# Patient Record
Sex: FEMALE | Race: BLACK OR AFRICAN AMERICAN | Employment: FULL TIME | URBAN - METROPOLITAN AREA
[De-identification: names, ages, dates, MRNs, and addresses within clinical notes are randomized per-mention and may not be internally consistent; named-entity substitution may affect disease eponyms.]

---

## 2017-10-23 ENCOUNTER — OP HISTORICAL/CONVERTED ENCOUNTER (OUTPATIENT)
Dept: OTHER | Age: 57
End: 2017-10-23

## 2018-02-11 ENCOUNTER — ED HISTORICAL/CONVERTED ENCOUNTER (OUTPATIENT)
Dept: OTHER | Age: 58
End: 2018-02-11

## 2018-04-23 ENCOUNTER — OP HISTORICAL/CONVERTED ENCOUNTER (OUTPATIENT)
Dept: OTHER | Age: 58
End: 2018-04-23

## 2019-02-09 ENCOUNTER — ED HISTORICAL/CONVERTED ENCOUNTER (OUTPATIENT)
Dept: OTHER | Age: 59
End: 2019-02-09

## 2019-04-07 ENCOUNTER — ED HISTORICAL/CONVERTED ENCOUNTER (OUTPATIENT)
Dept: OTHER | Age: 59
End: 2019-04-07

## 2019-10-15 ENCOUNTER — OP HISTORICAL/CONVERTED ENCOUNTER (OUTPATIENT)
Dept: OTHER | Age: 59
End: 2019-10-15

## 2020-01-01 ENCOUNTER — ED HISTORICAL/CONVERTED ENCOUNTER (OUTPATIENT)
Dept: OTHER | Age: 60
End: 2020-01-01

## 2020-05-28 LAB
CREATININE, EXTERNAL: 0.79
LDL-C, EXTERNAL: 129

## 2020-08-27 VITALS
OXYGEN SATURATION: 95 % | RESPIRATION RATE: 24 BRPM | HEIGHT: 67 IN | HEART RATE: 91 BPM | SYSTOLIC BLOOD PRESSURE: 128 MMHG | DIASTOLIC BLOOD PRESSURE: 76 MMHG | BODY MASS INDEX: 40.34 KG/M2 | TEMPERATURE: 98.9 F | WEIGHT: 257 LBS

## 2020-08-27 PROBLEM — F41.9 ANXIETY: Status: ACTIVE | Noted: 2020-08-27

## 2020-08-27 PROBLEM — Z95.828 PRESENCE OF STENT IN ARTERY: Status: ACTIVE | Noted: 2020-08-27

## 2020-08-27 PROBLEM — E78.00 PURE HYPERCHOLESTEROLEMIA: Status: ACTIVE | Noted: 2020-08-27

## 2020-08-27 PROBLEM — R53.83 FATIGUE: Status: ACTIVE | Noted: 2020-08-27

## 2020-08-27 PROBLEM — J30.9 ALLERGIC RHINITIS: Status: ACTIVE | Noted: 2020-08-27

## 2020-08-27 PROBLEM — E55.9 VITAMIN D DEFICIENCY: Status: ACTIVE | Noted: 2020-08-27

## 2020-08-27 PROBLEM — R05.9 COUGH: Status: ACTIVE | Noted: 2020-08-27

## 2020-08-27 PROBLEM — I25.10 CORONARY ATHEROSCLEROSIS OF NATIVE CORONARY ARTERY: Status: ACTIVE | Noted: 2020-08-27

## 2020-08-27 PROBLEM — M54.9 BACKACHE: Status: ACTIVE | Noted: 2020-08-27

## 2020-08-27 PROBLEM — K21.9 GERD (GASTROESOPHAGEAL REFLUX DISEASE): Status: ACTIVE | Noted: 2020-08-27

## 2020-08-27 PROBLEM — E66.9 OBESITY: Status: ACTIVE | Noted: 2020-08-27

## 2020-08-27 PROBLEM — I10 ESSENTIAL HYPERTENSION: Status: ACTIVE | Noted: 2020-08-27

## 2020-08-28 ENCOUNTER — OFFICE VISIT (OUTPATIENT)
Dept: INTERNAL MEDICINE CLINIC | Age: 60
End: 2020-08-28
Payer: COMMERCIAL

## 2020-08-28 VITALS
RESPIRATION RATE: 18 BRPM | BODY MASS INDEX: 36.85 KG/M2 | DIASTOLIC BLOOD PRESSURE: 80 MMHG | WEIGHT: 234.8 LBS | HEIGHT: 67 IN | SYSTOLIC BLOOD PRESSURE: 130 MMHG | TEMPERATURE: 98.6 F | HEART RATE: 72 BPM | OXYGEN SATURATION: 98 %

## 2020-08-28 DIAGNOSIS — I10 ESSENTIAL HYPERTENSION: Primary | ICD-10-CM

## 2020-08-28 DIAGNOSIS — E78.00 PURE HYPERCHOLESTEROLEMIA: ICD-10-CM

## 2020-08-28 DIAGNOSIS — Z95.828 PRESENCE OF STENT IN ARTERY: ICD-10-CM

## 2020-08-28 DIAGNOSIS — I25.10 ATHEROSCLEROSIS OF NATIVE CORONARY ARTERY OF NATIVE HEART WITHOUT ANGINA PECTORIS: ICD-10-CM

## 2020-08-28 DIAGNOSIS — M25.511 ACUTE PAIN OF RIGHT SHOULDER: ICD-10-CM

## 2020-08-28 DIAGNOSIS — E55.9 VITAMIN D DEFICIENCY: ICD-10-CM

## 2020-08-28 DIAGNOSIS — K21.9 GASTROESOPHAGEAL REFLUX DISEASE WITHOUT ESOPHAGITIS: ICD-10-CM

## 2020-08-28 PROCEDURE — 99214 OFFICE O/P EST MOD 30 MIN: CPT | Performed by: INTERNAL MEDICINE

## 2020-08-28 RX ORDER — OMEPRAZOLE 40 MG/1
40 CAPSULE, DELAYED RELEASE ORAL DAILY
Qty: 30 CAP | Refills: 2 | Status: SHIPPED | OUTPATIENT
Start: 2020-08-28 | End: 2021-02-09 | Stop reason: ALTCHOICE

## 2020-08-28 RX ORDER — TRAMADOL HYDROCHLORIDE 50 MG/1
50 TABLET ORAL
Qty: 30 TAB | Refills: 0 | Status: SHIPPED | OUTPATIENT
Start: 2020-08-28 | End: 2020-08-31

## 2020-08-28 RX ORDER — AMLODIPINE BESYLATE 5 MG/1
1 TABLET ORAL DAILY
COMMUNITY
Start: 2020-07-03 | End: 2021-02-09 | Stop reason: ALTCHOICE

## 2020-08-28 RX ORDER — ERGOCALCIFEROL 1.25 MG/1
50000 CAPSULE ORAL
Qty: 12 CAP | Refills: 0 | Status: SHIPPED | OUTPATIENT
Start: 2020-08-28 | End: 2021-02-09 | Stop reason: ALTCHOICE

## 2020-08-28 RX ORDER — ATORVASTATIN CALCIUM 40 MG/1
1 TABLET, FILM COATED ORAL DAILY
COMMUNITY
Start: 2020-05-26 | End: 2021-12-30 | Stop reason: SDUPTHER

## 2020-08-28 RX ORDER — METHYLPREDNISOLONE 4 MG/1
TABLET ORAL
Qty: 1 DOSE PACK | Refills: 0 | Status: SHIPPED | OUTPATIENT
Start: 2020-08-28 | End: 2020-10-28 | Stop reason: ALTCHOICE

## 2020-08-28 NOTE — PROGRESS NOTES
Clement Jessica is a 64 y.o. female and presents with Hypertension; Cholesterol Problem; and Follow Up Chronic Condition    She has consulted me last time in October 2019 and at that time she had her labs done at that time her lipid profile was uncontrolled with total cholesterol 232 and  with vitamin D was low in the past with 15 and hemoglobin A1c was 5.7 and currently she is taking atorvastatin 40 mg at bedtime. She is a known case of CAD status post stent she cannot take NSAIDs for long-term use. She has started acute right shoulder pain for last 2 weeks. According to her at her job she has to lift a lot of heavy boxes and she has not been able to elevate her right shoulder about the joint and also not able to have range of movement. She does not have history of fall or trauma. She told me that now she is started wearing the wig because she cannot reach to her hair. She is taking amlodipine 5 mg/day and she follows cardiologist Dr. Elvin Bocanegra, who has told her not to take telmisartan and just to continue amlodipine 5 mg/day. She has heartburn but currently not taking any medicines. She agreed for x-ray and the Medrol Dosepak and physical therapy once her pain is better. Agreed for doing blood work. She has no depression. She has quit smoking cigarettes after having coronary artery disease. .  Nothing helps in relief of her right shoulder pain and she is not taking anything. Not taking even Tylenol. Review of Systems    Review of Systems   Constitutional: Negative. HENT: Negative for sinus pain. Respiratory: Negative for cough, shortness of breath and wheezing. Cardiovascular: Negative. Gastrointestinal: Negative. Genitourinary: Negative. Musculoskeletal: Positive for joint pain. RT SHOULDER PAIN   Neurological: Negative for dizziness, tremors, speech change and headaches. Psychiatric/Behavioral: Negative for depression. The patient is not nervous/anxious. Nothing helps in relief of her right shoulder pain    Past Medical History:   Diagnosis Date    CAD (coronary artery disease)     Hypercholesterolemia     Hypertension      Past Surgical History:   Procedure Laterality Date    HX COLONOSCOPY  08/28/2017    HX CORONARY STENT PLACEMENT      HX PARTIAL HYSTERECTOMY       Social History     Socioeconomic History    Marital status:      Spouse name: Not on file    Number of children: Not on file    Years of education: Not on file    Highest education level: Not on file   Tobacco Use    Smoking status: Former Smoker    Smokeless tobacco: Never Used   Substance and Sexual Activity    Alcohol use: Yes     Comment: occ     Family History   Problem Relation Age of Onset    Arthritis-osteo Mother     Diabetes Father      Current Outpatient Medications   Medication Sig Dispense Refill    folic acid/multivit-min/lutein (CENTRUM SILVER PO) Take 1 Tab by mouth daily.  traMADoL (ULTRAM) 50 mg tablet Take 1 Tab by mouth nightly as needed for Pain for up to 3 days. Max Daily Amount: 50 mg. 30 Tab 0    omeprazole (PRILOSEC) 40 mg capsule Take 1 Cap by mouth daily. Take 30 minutes before eating 30 Cap 2    methylPREDNISolone (MEDROL DOSEPACK) 4 mg tablet Use as directed with food. Indications: inflammation of the sac surrounding the joint - bursitis, rt shoulder pain .h/o stent . can not take NSAIDS. 1 Dose Pack 0    ergocalciferol (ERGOCALCIFEROL) 1,250 mcg (50,000 unit) capsule Take 1 Cap by mouth every seven (7) days. 12 Cap 0    ADULT LOW DOSE ASPIRIN PO Take 1 Tab by mouth daily.  amLODIPine (NORVASC) 5 mg tablet Take 1 Tab by mouth daily.  atorvastatin (LIPITOR) 40 mg tablet Take 1 Tab by mouth daily.        No Known Allergies    Objective:  Visit Vitals  /80 (BP 1 Location: Left arm, BP Patient Position: Sitting)   Pulse 72   Temp 98.6 °F (37 °C) (Oral)   Resp 18   Ht 5' 7\" (1.702 m)   Wt 234 lb 12.8 oz (106.5 kg)   SpO2 98% BMI 36.77 kg/m²       Physical Exam:   Constitutional: General Appearance: healthy-appearing / obese. Level of Distress: NAD. Ambulation: ambulating   Psychiatric: Mental Status: normal mood and affect and active and alert. Orientation: to time, place, and person. no agitation. ,normal eye contact. normal insight  Head: Head: normocephalic and atraumatic. Eyes: Pupils: PERRLA. Sclerae: non-icteric. Neck: Neck: supple, trachea midline, and no masses. Lymph Nodes: no cervical LAD. Thyroid: no enlargement or nodules and non-tender. Lungs: Respiratory effort: no dyspnea. Auscultation: no wheezing, rales/crackles, or rhonchi and breath sounds normal and good air movement. Cardiovascular: Apical Impulse: not displaced. Heart Auscultation: normal S1 and S2; no murmurs, rubs, or gallops; and RRR. Neck vessels: no carotid bruits. Pulses including femoral / pedal: normal throughout. Abdomen: Bowel Sounds: normal. Inspection and Palpation: no tenderness, guarding, or masses and soft and non-distended. Liver: non-tender and no hepatomegaly. Spleen: non-tender and no splenomegaly. Musculoskeletal[de-identified] Extremities: no edema,no varicosities. No Calf tenderness. Neurologic: Gait and Station: normal gait and station. Motor Strength normal right and left. Skin: Inspection and palpation: no rash, lesions, or ulcer. Results for orders placed or performed in visit on 08/27/20   AMB EXT LDL-C   Result Value Ref Range    LDL-C, External 129    AMB EXT CREATININE   Result Value Ref Range    Creatinine, External 0.79        Assessment/Plan:    ICD-10-CM ICD-9-CM    1. Essential hypertension  I10 401.9 CBC WITH AUTOMATED DIFF      METABOLIC PANEL, COMPREHENSIVE   2. Pure hypercholesterolemia  E78.00 272.0 LIPID PANEL   3.  Acute pain of right shoulder  M25.511 719.41 XR SHOULDER RT AP/LAT MIN 2 V      traMADoL (ULTRAM) 50 mg tablet      REFERRAL TO PHYSICAL THERAPY   4. Vitamin D deficiency  E55.9 268.9 VITAMIN D, 25 HYDROXY   5. Gastroesophageal reflux disease without esophagitis  K21.9 530.81    6. Atherosclerosis of native coronary artery of native heart without angina pectoris  I25.10 414.01    7. Presence of stent in artery  Z95.828 V49.89      Orders Placed This Encounter    XR SHOULDER RT AP/LAT MIN 2 V     Standing Status:   Future     Standing Expiration Date:   9/28/2021     Order Specific Question:   Reason for Exam     Answer:   pain with restriceted rom with extension and elevation ,doing lots of lifting boxes    CBC WITH AUTOMATED DIFF    METABOLIC PANEL, COMPREHENSIVE    LIPID PANEL    VITAMIN D, 25 HYDROXY    [facility]     Referral Priority:   Routine     Referral Type:   PT/OT/ST     Referral Reason:   Specialty Services Required     Referral Location:   [facility] [place] [address]     Requested Specialty:   Physical Therapy     Number of Visits Requested:   12    ADULT LOW DOSE ASPIRIN PO     Sig: Take 1 Tab by mouth daily.  amLODIPine (NORVASC) 5 mg tablet     Sig: Take 1 Tab by mouth daily.  atorvastatin (LIPITOR) 40 mg tablet     Sig: Take 1 Tab by mouth daily.  folic acid/multivit-min/lutein (CENTRUM SILVER PO)     Sig: Take 1 Tab by mouth daily.  traMADoL (ULTRAM) 50 mg tablet     Sig: Take 1 Tab by mouth nightly as needed for Pain for up to 3 days. Max Daily Amount: 50 mg. Dispense:  30 Tab     Refill:  0    omeprazole (PRILOSEC) 40 mg capsule     Sig: Take 1 Cap by mouth daily. Take 30 minutes before eating     Dispense:  30 Cap     Refill:  2    methylPREDNISolone (MEDROL DOSEPACK) 4 mg tablet     Sig: Use as directed with food. Indications: inflammation of the sac surrounding the joint - bursitis, rt shoulder pain .h/o stent . can not take NSAIDS. Dispense:  1 Dose Pack     Refill:  0    ergocalciferol (ERGOCALCIFEROL) 1,250 mcg (50,000 unit) capsule     Sig: Take 1 Cap by mouth every seven (7) days.      Dispense:  12 Cap Refill:  0      hypertension controlled having CAD status post stent she is  taking only amlodipine 5 mg/day and not taking telmisartan that surprised me having a better cardiac protection she will discuss with her cardiologist when she has an  upcoming appointment for follow-up in September decision will be, up to her cardiologist and I discussed with patient. She has quit smoking cigarettes since she has stent placement. Diet low in sodium and heart healthy diet. Right shoulder pain various  differential discussed including bursitis or tendinitis and severe arthritis or partial rotator cuff tear she has started for last 2 weeks with unable to do range of movement, to the extent that she is not able comb her hair and  wearing the wig, she cannot take NSAID I started her on tramadol 50 mg at bedtime only and given her Medrol Dosepak and then once she is pain-free or her pain is better she can start physical therapy that I have referred her. Heating pad application and x-ray ordered before ordering any further imagings like MRI. Follow-up in 2 months. Vitamin D deficiency taking vitamin D once a week repeat labs ordered. Hypercholesterolemia repeat labs ordered continue atorvastatin 40 mg at bedtime diet low in fat and educational handouts provided. GERD refills given for omeprazole,. Allergic rhinitis continue symptomatic treatment but currently she does not have any abnormal symptoms otherwise she used to take montelukast and fluticasone nasal spray in the past.    Morbid obesity with BMI 36.7 heart healthy diet with diet less than 1500-calorie diet plan and walking minimum 2 miles 5 days a week to keep her heart healthy and to prevent stroke and cardiovascular and cerebrovascular morbidities mortalities and musculoskeletal pain. I reviewed the labs and logging in [place] discussed with her fasting labs ordered. Refills given. Follow-up in 2 months. Answered all her questions.   Patient appreciated. lose weight, increase physical activity, follow low fat diet, follow low salt diet, routine labs ordered, call if any problems    There are no Patient Instructions on file for this visit. Follow-up and Dispositions    · Return in about 2 months (around 10/28/2020) for rt shoulder pain ,fasting lab now ,ref to physical therapy,MULTIPLE MEDICAL COMORBIDITIES.

## 2020-10-28 ENCOUNTER — HOSPITAL ENCOUNTER (EMERGENCY)
Age: 60
Discharge: HOME | End: 2020-10-29
Payer: COMMERCIAL

## 2020-10-28 ENCOUNTER — APPOINTMENT (OUTPATIENT)
Dept: NON INVASIVE DIAGNOSTICS | Age: 60
End: 2020-10-28
Attending: EMERGENCY MEDICINE
Payer: COMMERCIAL

## 2020-10-28 ENCOUNTER — OFFICE VISIT (OUTPATIENT)
Dept: INTERNAL MEDICINE CLINIC | Age: 60
End: 2020-10-28
Payer: COMMERCIAL

## 2020-10-28 VITALS
BODY MASS INDEX: 36.51 KG/M2 | DIASTOLIC BLOOD PRESSURE: 82 MMHG | RESPIRATION RATE: 18 BRPM | HEIGHT: 67 IN | TEMPERATURE: 98 F | WEIGHT: 232.6 LBS | OXYGEN SATURATION: 98 % | HEART RATE: 72 BPM | SYSTOLIC BLOOD PRESSURE: 150 MMHG

## 2020-10-28 VITALS
BODY MASS INDEX: 36.41 KG/M2 | HEIGHT: 67 IN | HEART RATE: 68 BPM | SYSTOLIC BLOOD PRESSURE: 159 MMHG | RESPIRATION RATE: 17 BRPM | DIASTOLIC BLOOD PRESSURE: 96 MMHG | OXYGEN SATURATION: 99 % | TEMPERATURE: 98.1 F | WEIGHT: 232 LBS

## 2020-10-28 DIAGNOSIS — M79.605 PAIN IN BOTH LOWER EXTREMITIES: Primary | ICD-10-CM

## 2020-10-28 DIAGNOSIS — M79.89 PAIN AND SWELLING OF LOWER LEG, LEFT: ICD-10-CM

## 2020-10-28 DIAGNOSIS — M79.604 PAIN IN BOTH LOWER EXTREMITIES: Primary | ICD-10-CM

## 2020-10-28 DIAGNOSIS — Z95.828 PRESENCE OF STENT IN ARTERY: ICD-10-CM

## 2020-10-28 DIAGNOSIS — I10 ESSENTIAL HYPERTENSION: ICD-10-CM

## 2020-10-28 DIAGNOSIS — I25.10 ATHEROSCLEROSIS OF NATIVE CORONARY ARTERY OF NATIVE HEART WITHOUT ANGINA PECTORIS: ICD-10-CM

## 2020-10-28 DIAGNOSIS — R60.9 DEPENDENT EDEMA: Primary | ICD-10-CM

## 2020-10-28 DIAGNOSIS — M79.662 PAIN AND SWELLING OF LOWER LEG, LEFT: ICD-10-CM

## 2020-10-28 DIAGNOSIS — E66.01 SEVERE OBESITY (HCC): ICD-10-CM

## 2020-10-28 DIAGNOSIS — M79.89 LEG SWELLING: ICD-10-CM

## 2020-10-28 LAB
ALBUMIN SERPL-MCNC: 4 G/DL (ref 3.5–5)
ALBUMIN/GLOB SERPL: 1 {RATIO} (ref 1.1–2.2)
ALP SERPL-CCNC: 92 U/L (ref 45–117)
ALT SERPL-CCNC: 20 U/L (ref 12–78)
ANION GAP SERPL CALC-SCNC: 7 MMOL/L (ref 5–15)
AST SERPL W P-5'-P-CCNC: 16 U/L (ref 15–37)
BASOPHILS # BLD: 0 K/UL (ref 0–0.1)
BASOPHILS NFR BLD: 0 % (ref 0–1)
BILIRUB SERPL-MCNC: 0.4 MG/DL (ref 0.2–1)
BNP SERPL-MCNC: 31 PG/ML
BUN SERPL-MCNC: 12 MG/DL (ref 6–20)
BUN/CREAT SERPL: 13 (ref 12–20)
CA-I BLD-MCNC: 9.6 MG/DL (ref 8.5–10.1)
CHLORIDE SERPL-SCNC: 108 MMOL/L (ref 97–108)
CO2 SERPL-SCNC: 27 MMOL/L (ref 21–32)
CREAT SERPL-MCNC: 0.94 MG/DL (ref 0.55–1.02)
DIFFERENTIAL METHOD BLD: ABNORMAL
EOSINOPHIL # BLD: 0.2 K/UL (ref 0–0.4)
EOSINOPHIL NFR BLD: 3 % (ref 0–7)
ERYTHROCYTE [DISTWIDTH] IN BLOOD BY AUTOMATED COUNT: 13.7 % (ref 11.5–14.5)
GLOBULIN SER CALC-MCNC: 4 G/DL (ref 2–4)
GLUCOSE SERPL-MCNC: 82 MG/DL (ref 65–100)
HCT VFR BLD AUTO: 44.4 % (ref 35–47)
HGB BLD-MCNC: 13.9 G/DL (ref 11.5–16)
IMM GRANULOCYTES # BLD AUTO: 0 K/UL (ref 0–0.04)
IMM GRANULOCYTES NFR BLD AUTO: 0 % (ref 0–0.5)
LYMPHOCYTES # BLD: 3.4 K/UL (ref 0.8–3.5)
LYMPHOCYTES NFR BLD: 49 % (ref 12–49)
MCH RBC QN AUTO: 27.1 PG (ref 26–34)
MCHC RBC AUTO-ENTMCNC: 31.3 G/DL (ref 30–36.5)
MCV RBC AUTO: 86.5 FL (ref 80–99)
MONOCYTES # BLD: 0.4 K/UL (ref 0–1)
MONOCYTES NFR BLD: 7 % (ref 5–13)
NEUTS SEG # BLD: 2.8 K/UL (ref 1.8–8)
NEUTS SEG NFR BLD: 41 % (ref 32–75)
NRBC # BLD: 0.05 K/UL (ref 0–0.01)
NRBC BLD-RTO: 0.7 PER 100 WBC
PLATELET # BLD AUTO: 186 K/UL (ref 150–400)
PMV BLD AUTO: 11.9 FL (ref 8.9–12.9)
POTASSIUM SERPL-SCNC: 4.5 MMOL/L (ref 3.5–5.1)
PROT SERPL-MCNC: 8 G/DL (ref 6.4–8.2)
RBC # BLD AUTO: 5.13 M/UL (ref 3.8–5.2)
SODIUM SERPL-SCNC: 142 MMOL/L (ref 136–145)
TROPONIN I SERPL-MCNC: <0.05 NG/ML
WBC # BLD AUTO: 6.7 K/UL (ref 3.6–11)

## 2020-10-28 PROCEDURE — 36415 COLL VENOUS BLD VENIPUNCTURE: CPT

## 2020-10-28 PROCEDURE — 99213 OFFICE O/P EST LOW 20 MIN: CPT | Performed by: INTERNAL MEDICINE

## 2020-10-28 PROCEDURE — 80053 COMPREHEN METABOLIC PANEL: CPT

## 2020-10-28 PROCEDURE — 84484 ASSAY OF TROPONIN QUANT: CPT

## 2020-10-28 PROCEDURE — 99283 EMERGENCY DEPT VISIT LOW MDM: CPT

## 2020-10-28 PROCEDURE — 85025 COMPLETE CBC W/AUTO DIFF WBC: CPT

## 2020-10-28 PROCEDURE — 83880 ASSAY OF NATRIURETIC PEPTIDE: CPT

## 2020-10-28 PROCEDURE — 93971 EXTREMITY STUDY: CPT

## 2020-10-28 RX ORDER — LOSARTAN POTASSIUM AND HYDROCHLOROTHIAZIDE 12.5; 5 MG/1; MG/1
1 TABLET ORAL DAILY
COMMUNITY
End: 2021-12-30 | Stop reason: SDUPTHER

## 2020-10-28 NOTE — ED TRIAGE NOTES
Sent by Dr. Nedra Greene for possible blood clot in Left leg. Pt states she works on her feet and has complaints on bilateral foot and ankle pain.

## 2020-10-28 NOTE — PROGRESS NOTES
Fernando Walton is a 64 y.o. female and presents with Hypertension (C/O BILATERAL FOOT AND LEGS X 1 WEEK, denies trauma or injury but does a lot of walking at work     9/10); Cholesterol Problem; Follow Up Chronic Condition; and Foot Pain    She came having severe pain in both lower legs left more than right and swelling in both lower legs with swelling of food for last 1 week without having history of fall or trauma , she has difficulty in walking due to pain she has CAD s/p stent with history of hypertension and hypercholesterolemia,. Her blood pressure is elevated today in the office. Review of Systems    Review of Systems   Constitutional: Negative. Eyes: Negative for blurred vision. Respiratory: Negative. Cardiovascular: Positive for leg swelling. Negative for chest pain, palpitations, orthopnea and claudication. Bilateral leg swelling for last 1 week   Gastrointestinal: Negative. Genitourinary: Negative. Musculoskeletal:        Bilateral leg pain and lt foot more than rt foot. for 1 week   Skin: Negative. Neurological: Negative for dizziness, tingling, speech change and headaches. Psychiatric/Behavioral: Negative for depression. The patient does not have insomnia.          Past Medical History:   Diagnosis Date    CAD (coronary artery disease)     Hypercholesterolemia     Hypertension      Past Surgical History:   Procedure Laterality Date    HX COLONOSCOPY  08/28/2017    HX CORONARY STENT PLACEMENT      HX PARTIAL HYSTERECTOMY       Social History     Socioeconomic History    Marital status:      Spouse name: Not on file    Number of children: Not on file    Years of education: Not on file    Highest education level: Not on file   Tobacco Use    Smoking status: Former Smoker    Smokeless tobacco: Never Used   Substance and Sexual Activity    Alcohol use: Yes     Comment: occ     Family History   Problem Relation Age of Onset    Arthritis-osteo Mother    [address] [facility] Diabetes Father      Current Outpatient Medications   Medication Sig Dispense Refill    losartan-hydroCHLOROthiazide (HYZAAR) 50-12.5 mg per tablet Take 1 Tab by mouth daily.  ADULT LOW DOSE ASPIRIN PO Take 1 Tab by mouth daily.  amLODIPine (NORVASC) 5 mg tablet Take 1 Tab by mouth daily.  atorvastatin (LIPITOR) 40 mg tablet Take 1 Tab by mouth daily.  folic acid/multivit-min/lutein (CENTRUM SILVER PO) Take 1 Tab by mouth daily.  omeprazole (PRILOSEC) 40 mg capsule Take 1 Cap by mouth daily. Take 30 minutes before eating 30 Cap 2    ergocalciferol (ERGOCALCIFEROL) 1,250 mcg (50,000 unit) capsule Take 1 Cap by mouth every seven (7) days. 12 Cap 0     No Known Allergies    Objective:  Visit Vitals  BP (!) 150/82 (BP 1 Location: Right arm, BP Patient Position: Sitting)   Pulse 72   Temp 98 °F (36.7 °C) (Temporal)   Resp 18   Ht 5' 7\" (1.702 m)   Wt 232 lb 9.6 oz (105.5 kg)   SpO2 98%   BMI 36.43 kg/m²       Physical Exam:   Constitutional: General Appearance: Morbid obese with pleasant personality in mild distress due to pain. Level of Distress: Mild. Psychiatric: Mental Status: normal mood and affect Orientation: to time, place, and person. ,normal eye contact. Head: Head: normocephalic and atraumatic. Eyes: Pupils: PERRLA. Sclerae: non-icteric. Neck: Neck: supple, trachea midline, and no masses. Lymph Nodes: no cervical LAD. Thyroid: no enlargement or nodules and non-tender. Lungs: Respiratory effort: no dyspnea. Auscultation: no wheezing, rales/crackles, or rhonchi and breath sounds normal and good air movement. Cardiovascular: Apical Impulse: not displaced. Heart Auscultation: normal S1 and S2; no murmurs, rubs, or gallops; and RRR. Neck vessels: no carotid bruits. Pulses including femoral / pedal: normal throughout. Abdomen: Bowel Sounds: normal. Inspection and Palpation: no tenderness, guarding, or masses and soft and non-distended. Liver: non-tender and no hepatomegaly. Spleen: non-tender and no splenomegaly. Musculoskeletal[de-identified] Extremities: Mild edema in both lower legs left more than right also swelling of left foot,no varicosities. Calf tenderness. With superficial palpation could not do deep palpation. Restricted range of movement at the left foot. Neurologic: Gait and Station:  limping gait. Skin: Inspection and palpation: Peripheral edema in both lower legs could not allow me to do deep palpation of both lower legs especially left lower leg having pain,. Results for orders placed or performed in visit on 08/27/20   AMB EXT LDL-C   Result Value Ref Range    LDL-C, External 129    AMB EXT CREATININE   Result Value Ref Range    Creatinine, External 0.79        Assessment/Plan:      ICD-10-CM ICD-9-CM    1. Pain in both lower extremities  M79.604 729.5     M79.605     2. Severe obesity ([address]  E66.01 278.01    3. Pain and swelling of lower leg, left  M79.662 729.5     M79.89 729.81    4. Essential hypertension  I10 401.9    5. Atherosclerosis of native coronary artery of native heart without angina pectoris  I25.10 414.01    6. Presence of stent in artery  Z95.828 V49.89      Orders Placed This Encounter    losartan-hydroCHLOROthiazide (HYZAAR) 50-12.5 mg per tablet     Sig: Take 1 Tab by mouth daily. Bilateral leg pain and swelling with limping gait for last 1 week, her blood pressure is elevated according to her it is most likely due to pain, she was brought by her son, I will send her to ER to rule out, blood clot, she agreed she already takes losartan hydrochlorothiazide 50/12.5 mg/day and amlodipine 5 mg/day. I will evaluate her after discharge from the ER when she is pain-free, with her reevaluation of blood pressure and if needed I will make changes in her antihypertensive she has CAD s/p stent continued on atorvastatin 40 mg/day.   I asked my nurse to call emergency room, and notified that she is coming in she told me her son will come and will take her to ER.  Education given. No history of fall or trauma she has restricted range of movement at left foot  continue present plan, she did not allow me to do palpation of the calf muscle having pain especially left leg,    There are no Patient Instructions on file for this visit. Follow-up and Dispositions    · Return in about 6 weeks (around 12/9/2020) for ptn is sent to ER to r/o dvt, follow-up After discharge from ER.

## 2020-10-29 NOTE — ED NOTES
Spoke with patient in reference to her negative DVT, increased flow  preliminary report. Pt states she has an appointment with her cardiologist next week.

## 2020-10-29 NOTE — ED PROVIDER NOTES
EMERGENCY DEPARTMENT HISTORY AND PHYSICAL EXAM      Date: 10/28/2020  Patient Name: Rufino Rice    History of Presenting Illness     Chief Complaint   Patient presents with    Leg Pain    Foot Pain       History Provided By: Patient    HPI: Rufino Rice, 64 y.o. female with a past medical history significant diabetes, hypertension, hyperlipidemia and CAD presents to the ED with cc of bilateral foot and leg pain onset 1 week ago and intermittent in nature. She describes the pain as severe however the pain has resolved since being in the emergency department. Associated symptoms include swelling, left greater than right. Patient was seen by her PCP today who was concerned about the swelling and sent her to the ED to be evaluated for a DVT. Patient denies any history of blood clots. She does have a history of coronary artery disease however she no longer takes Brilinta. She quit smoking recently. She is on her feet at work for multiple hours per day. She denies fever, chills, chest pain, shortness of breath, recent travel, history of cancer, recent illness, recent injury, recent surgery, any known trauma, numbness, tingling      There are no other complaints, changes, or physical findings at this time. PCP: Ami Ku MD    No current facility-administered medications on file prior to encounter. Current Outpatient Medications on File Prior to Encounter   Medication Sig Dispense Refill    losartan-hydroCHLOROthiazide (HYZAAR) 50-12.5 mg per tablet Take 1 Tab by mouth daily.  ADULT LOW DOSE ASPIRIN PO Take 1 Tab by mouth daily.  amLODIPine (NORVASC) 5 mg tablet Take 1 Tab by mouth daily.  atorvastatin (LIPITOR) 40 mg tablet Take 1 Tab by mouth daily.  folic acid/multivit-min/lutein (CENTRUM SILVER PO) Take 1 Tab by mouth daily.  omeprazole (PRILOSEC) 40 mg capsule Take 1 Cap by mouth daily.  Take 30 minutes before eating 30 Cap 2    ergocalciferol (ERGOCALCIFEROL) 1,250 mcg (50,000 unit) capsule Take 1 Cap by mouth every seven (7) days. 12 Cap 0       Past History     Past Medical History:  Past Medical History:   Diagnosis Date    CAD (coronary artery disease)     Hypercholesterolemia     Hypertension        Past Surgical History:  Past Surgical History:   Procedure Laterality Date    HX COLONOSCOPY  08/28/2017    HX CORONARY STENT PLACEMENT      HX PARTIAL HYSTERECTOMY         Family History:  Family History   Problem Relation Age of Onset    Arthritis-osteo Mother     Diabetes Father        Social History:  Social History     Tobacco Use    Smoking status: Former Smoker    Smokeless tobacco: Never Used   Substance Use Topics    Alcohol use: Yes     Comment: occ    Drug use: Not on file       Allergies:  No Known Allergies      Review of Systems   Review of Systems   Constitutional: Negative for activity change, chills and fever. HENT: Negative for congestion, ear pain, rhinorrhea, sneezing and sore throat. Eyes: Negative for pain and visual disturbance. Respiratory: Negative for cough, chest tightness and shortness of breath. Cardiovascular: Positive for leg swelling. Negative for chest pain. Gastrointestinal: Negative for abdominal pain, diarrhea, nausea and vomiting. Genitourinary: Negative for dysuria and hematuria. Musculoskeletal: Positive for gait problem and myalgias. Skin: Negative for rash. Neurological: Negative for speech difficulty, weakness, numbness and headaches. Psychiatric/Behavioral: The patient is not nervous/anxious. All other systems reviewed and are negative. Physical Exam   Physical Exam  Vitals signs and nursing note reviewed. Constitutional:       General: She is not in acute distress. Appearance: Normal appearance. She is not ill-appearing or toxic-appearing. HENT:      Head: Normocephalic and atraumatic.       Nose: Nose normal.      Mouth/Throat:      Mouth: Mucous membranes are moist.   Eyes: Extraocular Movements: Extraocular movements intact. Conjunctiva/sclera: Conjunctivae normal.      Pupils: Pupils are equal, round, and reactive to light. Neck:      Musculoskeletal: Normal range of motion. Cardiovascular:      Rate and Rhythm: Normal rate. Pulses: Normal pulses. Heart sounds: Normal heart sounds. No murmur. Comments: Negative Homans' sign, no calf tenderness to palpation at this time  Pulmonary:      Effort: Pulmonary effort is normal. No respiratory distress. Breath sounds: Normal breath sounds. Abdominal:      General: Bowel sounds are normal.      Palpations: Abdomen is soft. Tenderness: There is no abdominal tenderness. Musculoskeletal: Normal range of motion. General: No deformity or signs of injury. Right lower leg: Edema (non-pitting) present. Left lower le+ Edema (pitting) present. Skin:     General: Skin is warm and dry. Capillary Refill: Capillary refill takes less than 2 seconds. Findings: No rash. Neurological:      General: No focal deficit present. Mental Status: She is alert and oriented to person, place, and time. Cranial Nerves: No cranial nerve deficit. Psychiatric:         Mood and Affect: Mood normal.         Diagnostic Study Results     Labs -     Recent Results (from the past 48 hour(s))   CBC WITH AUTOMATED DIFF    Collection Time: 10/28/20  7:43 PM   Result Value Ref Range    WBC 6.7 3.6 - 11.0 K/uL    RBC 5.13 3.80 - 5.20 M/uL    HGB 13.9 11.5 - 16.0 g/dL    HCT 44.4 35.0 - 47.0 %    MCV 86.5 80.0 - 99.0 FL    MCH 27.1 26.0 - 34.0 PG    MCHC 31.3 30.0 - 36.5 g/dL    RDW 13.7 11.5 - 14.5 %    PLATELET 983 786 - 247 K/uL    MPV 11.9 8.9 - 12.9 FL    NRBC 0.7 (H) 0  WBC    ABSOLUTE NRBC 0.05 (H) 0.00 - 0.01 K/uL    NEUTROPHILS 41 32 - 75 %    LYMPHOCYTES 49 12 - 49 %    MONOCYTES 7 5 - 13 %    EOSINOPHILS 3 0 - 7 %    BASOPHILS 0 0 - 1 %    IMMATURE GRANULOCYTES 0 0.0 - 0.5 %    ABS. NEUTROPHILS 2.8 1.8 - 8.0 K/UL    ABS. LYMPHOCYTES 3.4 0.8 - 3.5 K/UL    ABS. MONOCYTES 0.4 0.0 - 1.0 K/UL    ABS. EOSINOPHILS 0.2 0.0 - 0.4 K/UL    ABS. BASOPHILS 0.0 0.0 - 0.1 K/UL    ABS. IMM. GRANS. 0.0 0.00 - 0.04 K/UL    DF AUTOMATED     METABOLIC PANEL, COMPREHENSIVE    Collection Time: 10/28/20  7:43 PM   Result Value Ref Range    Sodium 142 136 - 145 mmol/L    Potassium 4.5 3.5 - 5.1 mmol/L    Chloride 108 97 - 108 mmol/L    CO2 27 21 - 32 mmol/L    Anion gap 7 5 - 15 mmol/L    Glucose 82 65 - 100 mg/dL    BUN 12 6 - 20 mg/dL    Creatinine 0.94 0.55 - 1.02 mg/dL    BUN/Creatinine ratio 13 12 - 20      GFR est AA >60 >60 ml/min/1.73m2    GFR est non-AA >60 >60 ml/min/1.73m2    Calcium 9.6 8.5 - 10.1 mg/dL    Bilirubin, total 0.4 0.2 - 1.0 mg/dL    AST (SGOT) 16 15 - 37 U/L    ALT (SGPT) 20 12 - 78 U/L    Alk. phosphatase 92 45 - 117 U/L    Protein, total 8.0 6.4 - 8.2 g/dL    Albumin 4.0 3.5 - 5.0 g/dL    Globulin 4.0 2.0 - 4.0 g/dL    A-G Ratio 1.0 (L) 1.1 - 2.2     BNP    Collection Time: 10/28/20  7:43 PM   Result Value Ref Range    NT pro-BNP 31 <125 pg/mL   TROPONIN I    Collection Time: 10/28/20  7:43 PM   Result Value Ref Range    Troponin-I, Qt. <0.05 <0.05 ng/mL       Radiologic Studies -   No results found for this or any previous visit. CT Results  (Last 48 hours)    None          Medical Decision Making   I am the first provider for this patient. I reviewed the vital signs, available nursing notes, past medical history, past surgical history, family history and social history. Vital Signs-Reviewed the patient's vital signs.   Patient Vitals for the past 12 hrs:   Temp Pulse Resp BP SpO2   10/28/20 2246  68 17 (!) 159/96 99 %   10/28/20 1900  82 18 (!) 147/95 (!) 82 %   10/28/20 1751 98.1 °F (36.7 °C) 67 18 (!) 185/97 100 %       Records Reviewed: Nursing Notes    Provider Notes (Medical Decision Making):     MDM  Number of Diagnoses or Management Options  Dependent edema:   Leg swelling: Diagnosis management comments: DDX: DVT, dependent edema, renal insufficiency, electrolyte disturbance    This is a 65-year-old female with cardiac history who presented to the ED sent from her PCP to rule out DVT. She has bilateral swelling of the lower extremities, left greater than right, and calf pain which is currently resolved. On arrival, a bilateral duplex ultrasound of the lower extremities was ordered and completed by ultrasound technician. There are no preliminary results in the chart. No preliminary results were communicated to this provider. The patient was in the waiting room for quite some time before she was placed into a bed. Upon my evaluation, I was unable to find any results of the bilateral duplex study. Patient does not have a peripheral IV in place. I was also unable to add on any D-dimer test to her already existing blood work in the lab. Patient is wanting to leave without any further work-up. I gave the patient the option of returning to outpatient testing in the morning to have the duplex study repeated. She did not voice any desire to do this as she does not want the test to have to be repeated. My plan for this patient is to have ED nurse director to follow-up on these results in the morning when the ultrasound technician arrives and is able to look at the study and push it through to the PACS system. No radiologist is reading duplex ultrasound images at the current time, and we are unable to view these images ourselves on the PACS system. I Spoke with Ted, nursing supervisor after several hours of attempting to obtain Duplex US results. I spoke with the radiology department. There is no way for us to reach the technician who completed the test.  Multiple individuals have checked in this patient's chart for the preliminary results and we have been unable to find them.     I have a low suspicion for DVT, there is no history of cancer, no history of DVT, no chest pain, no shortness of breath, no recent surgery or recent travel. Her swelling is also bilateral and her pain is resolved at this time. Her distal pulses are intact and she has a negative Homans' sign. I will have her follow-up with her PCP in regards to dependent edema. She is also been advised to return to the ED if her symptoms worsen and she develops any chest pain or shortness of breath. ED Course:   Initial assessment performed. The patients presenting problems have been discussed, and they are in agreement with the care plan formulated and outlined with them. I have encouraged them to ask questions as they arise throughout their visit. PROCEDURES    Procedures     DISPOSITION    Discharged    PLAN:  1. Discharge Medication List as of 10/29/2020  1:04 AM         2. Follow-up Information     Follow up With Specialties Details Why Contact Info    Ami Ku MD Internal Medicine Schedule an appointment as soon as possible for a visit for follow up from ER visit [address] [address]  [phone]      [address] [place] [address] EMERGENCY DEPT Emergency Medicine  As needed, If symptoms worsen [address] [id number] [phone]        Return to ED if worse     Diagnosis     Clinical Impression:   1. Dependent edema    2.  Leg swelling

## 2020-10-29 NOTE — DISCHARGE INSTRUCTIONS
Patient Education        Leg and Ankle Edema: Care Instructions  Your Care Instructions  Swelling in the legs, ankles, and feet is called edema. It is common after you sit or stand for a while. Long plane flights or car rides often cause swelling in the legs and feet. You may also have swelling if you have to stand for long periods of time at your job. Problems with the veins in the legs (varicose veins) and changes in hormones can also cause swelling. Sometimes the swelling in the ankles and feet is caused by a more serious problem, such as heart failure, infection, blood clots, or liver or kidney disease. Follow-up care is a key part of your treatment and safety. Be sure to make and go to all appointments, and call your doctor if you are having problems. It's also a good idea to know your test results and keep a list of the medicines you take. How can you care for yourself at home? · If your doctor gave you medicine, take it as prescribed. Call your doctor if you think you are having a problem with your medicine. · Whenever you are resting, raise your legs up. Try to keep the swollen area higher than the level of your heart. · Take breaks from standing or sitting in one position. ? Walk around to increase the blood flow in your lower legs. ? Move your feet and ankles often while you stand, or tighten and relax your leg muscles. · Wear support stockings. Put them on in the morning, before swelling gets worse. · Eat a balanced diet. Lose weight if you need to. · Limit the amount of salt (sodium) in your diet. Salt holds fluid in the body and may increase swelling. When should you call for help? Call 911 anytime you think you may need emergency care. For example, call if:    · You have symptoms of a blood clot in your lung (called a pulmonary embolism). These may include:  ? Sudden chest pain. ? Trouble breathing. ? Coughing up blood.    Call your doctor now or seek immediate medical care if:    · You have signs of a blood clot, such as:  ? Pain in your calf, back of the knee, thigh, or groin. ? Redness and swelling in your leg or groin.     · You have symptoms of infection, such as:  ? Increased pain, swelling, warmth, or redness. ? Red streaks or pus. ? A fever. Watch closely for changes in your health, and be sure to contact your doctor if:    · Your swelling is getting worse.     · You have new or worsening pain in your legs.     · You do not get better as expected. Where can you learn more? Go to http://www.gray.com/  Enter J528 in the search box to learn more about \"Leg and Ankle Edema: Care Instructions. \"  Current as of: June 26, 2019               Content Version: 12.6  © 1495-4089 [employer]. Care instructions adapted under license by [employer] (which disclaims liability or warranty for this information). If you have questions about a medical condition or this instruction, always ask your healthcare professional. [address] [place] [address] any warranty or liability for your use of this information.

## 2020-11-03 ENCOUNTER — TELEPHONE (OUTPATIENT)
Dept: INTERNAL MEDICINE CLINIC | Age: 60
End: 2020-11-03

## 2020-11-03 NOTE — TELEPHONE ENCOUNTER
PT. Request Return work excuse, legs are still swollen, but she needs to go  Back to work, from 10/28/2020 thru 11/09/2020, off on weekends.

## 2020-11-09 ENCOUNTER — OFFICE VISIT (OUTPATIENT)
Dept: INTERNAL MEDICINE CLINIC | Age: 60
End: 2020-11-09
Payer: COMMERCIAL

## 2020-11-09 VITALS
HEART RATE: 84 BPM | BODY MASS INDEX: 36.41 KG/M2 | OXYGEN SATURATION: 99 % | WEIGHT: 232 LBS | HEIGHT: 67 IN | DIASTOLIC BLOOD PRESSURE: 70 MMHG | RESPIRATION RATE: 18 BRPM | SYSTOLIC BLOOD PRESSURE: 130 MMHG | TEMPERATURE: 98.2 F

## 2020-11-09 DIAGNOSIS — G89.29 CHRONIC PAIN OF BOTH FEET: Primary | ICD-10-CM

## 2020-11-09 DIAGNOSIS — M79.671 CHRONIC PAIN OF BOTH FEET: Primary | ICD-10-CM

## 2020-11-09 DIAGNOSIS — M79.672 CHRONIC PAIN OF BOTH FEET: Primary | ICD-10-CM

## 2020-11-09 DIAGNOSIS — Z02.9 ADMINISTRATIVE ENCOUNTER: ICD-10-CM

## 2020-11-09 PROCEDURE — 99213 OFFICE O/P EST LOW 20 MIN: CPT | Performed by: INTERNAL MEDICINE

## 2020-11-09 RX ORDER — GABAPENTIN 100 MG/1
100 CAPSULE ORAL
Qty: 30 CAP | Refills: 1 | Status: SHIPPED | OUTPATIENT
Start: 2020-11-09 | End: 2021-02-09 | Stop reason: ALTCHOICE

## 2020-11-09 NOTE — PROGRESS NOTES
Samara Hutchins is a 64 y.o. female and presents with Foot Pain    She came with complaints of bilateral foot pain,   in last visit I had referred her to ER to rule out DVT she is negative for superficial and deep blood clot, she follows cardiologist Dr. Juan Lester, who has scheduled her for venous, Doppler again and venous occlusion test, also, she has extreme pain, she does not allow me to touch her feet, x-ray ordered she wants to stay out of work I kept her out of work until Friday to give rest to her feet,, I started her on gabapentin,I cannot give her NSAIDs having history of CAD s/p stent,,  She does not want to take f tramadol or gabapentin however she agreed to try low-dose of gabapentin,. Review of Systems    Review of Systems   Constitutional: Negative. HENT: Negative for ear pain, hearing loss and sinus pain. Eyes: Negative for blurred vision. Respiratory: Negative for cough, shortness of breath and wheezing. Cardiovascular: Negative. Gastrointestinal: Negative. Genitourinary: Negative for dysuria. Musculoskeletal: Positive for joint pain. Negative for falls, myalgias and neck pain. Feet pain. Neurological: Negative for dizziness, tingling, tremors, sensory change and headaches. Psychiatric/Behavioral: Negative for depression. The patient is not nervous/anxious and does not have insomnia.          Past Medical History:   Diagnosis Date    CAD (coronary artery disease)     Hypercholesterolemia     Hypertension      Past Surgical History:   Procedure Laterality Date    HX COLONOSCOPY  08/28/2017    HX CORONARY STENT PLACEMENT      HX PARTIAL HYSTERECTOMY       Social History     Socioeconomic History    Marital status:      Spouse name: Not on file    Number of children: Not on file    Years of education: Not on file    Highest education level: Not on file   Tobacco Use    Smoking status: Former Smoker    Smokeless tobacco: Never Used   Substance and Sexual Activity    Alcohol use: Yes     Comment: occ     Family History   Problem Relation Age of Onset    Arthritis-osteo Mother     Diabetes Father      Current Outpatient Medications   Medication Sig Dispense Refill    gabapentin (NEURONTIN) 100 mg capsule Take 1 Cap by mouth nightly. Max Daily Amount: 100 mg. 30 Cap 1    losartan-hydroCHLOROthiazide (HYZAAR) 50-12.5 mg per tablet Take 1 Tab by mouth daily.  ADULT LOW DOSE ASPIRIN PO Take 1 Tab by mouth daily.  amLODIPine (NORVASC) 5 mg tablet Take 1 Tab by mouth daily.  atorvastatin (LIPITOR) 40 mg tablet Take 1 Tab by mouth daily.  folic acid/multivit-min/lutein (CENTRUM SILVER PO) Take 1 Tab by mouth daily.  omeprazole (PRILOSEC) 40 mg capsule Take 1 Cap by mouth daily. Take 30 minutes before eating 30 Cap 2    ergocalciferol (ERGOCALCIFEROL) 1,250 mcg (50,000 unit) capsule Take 1 Cap by mouth every seven (7) days. 12 Cap 0     No Known Allergies    Objective:  Visit Vitals  /70 (BP 1 Location: Right arm, BP Patient Position: Sitting)   Pulse 84   Temp 98.2 °F (36.8 °C) (Oral)   Resp 18   Ht 5' 7\" (1.702 m)   Wt 232 lb (105.2 kg)   SpO2 99%   BMI 36.34 kg/m²       Physical Exam:   Constitutional: General Appearance: Morbid obesity. Level of Distress: NAD. Psychiatric: Mental Status: normal mood and affect Orientation: to time, place, and person. ,normal eye contact. Head: Head: normocephalic and atraumatic. Eyes: Pupils: PERRLA. Sclerae: non-icteric. Neck: Neck: supple, trachea midline, and no masses. Lymph Nodes: no cervical LAD. Thyroid: no enlargement or nodules and non-tender. Lungs: Respiratory effort: no dyspnea. Auscultation: no wheezing, rales/crackles, or rhonchi and breath sounds normal and good air movement. Cardiovascular: Apical Impulse: not displaced. Heart Auscultation: normal S1 and S2; no murmurs, rubs, or gallops; and RRR. Neck vessels: no carotid bruits.  Pulses including femoral / pedal: normal throughout. Abdomen: Bowel Sounds: normal. Inspection and Palpation: no tenderness, guarding, or masses and soft and non-distended. Liver: non-tender and no hepatomegaly. Spleen: non-tender and no splenomegaly. Musculoskeletal[de-identified] Extremities: no edema,no varicosities. No Calf tenderness. Bilateral feet pain, especially toes and the calcaneus,  Neurologic: Gait and Station: slight limping due to pain. Motor Strength normal right and left.,  Not allowing me to check her feet, is wearing compression stockings,  Skin: Inspection and palpation: no rash, lesions, or ulcer. Bilateral feet pain      Results for orders placed or performed during the hospital encounter of 10/28/20   CBC WITH AUTOMATED DIFF   Result Value Ref Range    WBC 6.7 3.6 - 11.0 K/uL    RBC 5.13 3.80 - 5.20 M/uL    HGB 13.9 11.5 - 16.0 g/dL    HCT 44.4 35.0 - 47.0 %    MCV 86.5 80.0 - 99.0 FL    MCH 27.1 26.0 - 34.0 PG    MCHC 31.3 30.0 - 36.5 g/dL    RDW 13.7 11.5 - 14.5 %    PLATELET 351 925 - 834 K/uL    MPV 11.9 8.9 - 12.9 FL    NRBC 0.7 (H) 0  WBC    ABSOLUTE NRBC 0.05 (H) 0.00 - 0.01 K/uL    NEUTROPHILS 41 32 - 75 %    LYMPHOCYTES 49 12 - 49 %    MONOCYTES 7 5 - 13 %    EOSINOPHILS 3 0 - 7 %    BASOPHILS 0 0 - 1 %    IMMATURE GRANULOCYTES 0 0.0 - 0.5 %    ABS. NEUTROPHILS 2.8 1.8 - 8.0 K/UL    ABS. LYMPHOCYTES 3.4 0.8 - 3.5 K/UL    ABS. MONOCYTES 0.4 0.0 - 1.0 K/UL    ABS. EOSINOPHILS 0.2 0.0 - 0.4 K/UL    ABS. BASOPHILS 0.0 0.0 - 0.1 K/UL    ABS. IMM.  GRANS. 0.0 0.00 - 0.04 K/UL    DF AUTOMATED     METABOLIC PANEL, COMPREHENSIVE   Result Value Ref Range    Sodium 142 136 - 145 mmol/L    Potassium 4.5 3.5 - 5.1 mmol/L    Chloride 108 97 - 108 mmol/L    CO2 27 21 - 32 mmol/L    Anion gap 7 5 - 15 mmol/L    Glucose 82 65 - 100 mg/dL    BUN 12 6 - 20 mg/dL    Creatinine 0.94 0.55 - 1.02 mg/dL    BUN/Creatinine ratio 13 12 - 20      GFR est AA >60 >60 ml/min/1.73m2    GFR est non-AA >60 >60 ml/min/1.73m2    Calcium 9.6 8.5 - 10.1 mg/dL Bilirubin, total 0.4 0.2 - 1.0 mg/dL    AST (SGOT) 16 15 - 37 U/L    ALT (SGPT) 20 12 - 78 U/L    Alk. phosphatase 92 45 - 117 U/L    Protein, total 8.0 6.4 - 8.2 g/dL    Albumin 4.0 3.5 - 5.0 g/dL    Globulin 4.0 2.0 - 4.0 g/dL    A-G Ratio 1.0 (L) 1.1 - 2.2     BNP   Result Value Ref Range    NT pro-BNP 31 <125 pg/mL   TROPONIN I   Result Value Ref Range    Troponin-I, Qt. <0.05 <0.05 ng/mL       Assessment/Plan:      ICD-10-CM ICD-9-CM    1. Chronic pain of both feet  M79.671 729.5 REFERRAL TO PODIATRY    G89.29 338.29 XR FOOT RT MIN 3 V    M79.672  XR FOOT LT MIN 3 V      gabapentin (NEURONTIN) 100 mg capsule   2. Administrative encounter  Z02.9 V68.9      Orders Placed This Encounter    XR FOOT RT MIN 3 V     R/O SPUR,     Standing Status:   Future     Standing Expiration Date:   12/9/2021    XR FOOT LT MIN 3 V     Standing Status:   Future     Standing Expiration Date:   12/9/2021    REFERRAL TO PODIATRY     Referral Priority:   Routine     Referral Type:   Consultation     Referral Reason:   Specialty Services Required     Referred to Provider:   Wil Serrano DPM     Number of Visits Requested:   1    gabapentin (NEURONTIN) 100 mg capsule     Sig: Take 1 Cap by mouth nightly. Max Daily Amount: 100 mg. Dispense:  30 Cap     Refill:  1         Pain in both feet she has been ruled out for blood clot in the superficial venous system and deep venous system, I had referred her to ER few days ago I reviewed the results and discussed with her,, she follows cardiologist, Dr. Rosalie Ortiz?   Who has scheduled her for, venous occlusion and venous Doppler,, when I called the office I could not reach doctors because they were not in the office, I spoke with the  and she gave me, the information,, I have referred her to podiatrist continue wearing, compression stockings, also, started on gabapentin low-dose because she does not want to be on higher dose, she does not want to be on tramadol that can make her sleepy, she cannot take NSAIDs having history of CAD s/p stent, her blood pressure is controlled, she is limping due to pain I kept her out of work until Friday,, maintain customary follow-up with podiatrist and follow the recommendation given by foot doctor,, I am trying my best to help her. continue present plan. x-ray of bilateral feet ordered to rule out any spur or any abnormalities. There are no Patient Instructions on file for this visit. Follow-up and Dispositions    · Return in about 3 months (around 2/9/2021) for f/u and ref to dr blankesnhip .

## 2020-11-09 NOTE — LETTER
NOTIFICATION RETURN TO WORK / SCHOOL 
 
11/9/2020 4:46 PM 
 
Ms. Otoniel Gibbs [phone] To Whom It May Concern: 
 
Otoniel Gibbs is currently under the care of [address] [place] [address]. She will return to work/school on 11/16/2020. If there are questions or concerns please have the patient contact our office. Sincerely, [facility]

## 2021-02-09 ENCOUNTER — OFFICE VISIT (OUTPATIENT)
Dept: INTERNAL MEDICINE CLINIC | Age: 61
End: 2021-02-09
Payer: COMMERCIAL

## 2021-02-09 VITALS
SYSTOLIC BLOOD PRESSURE: 120 MMHG | RESPIRATION RATE: 18 BRPM | DIASTOLIC BLOOD PRESSURE: 74 MMHG | WEIGHT: 242 LBS | BODY MASS INDEX: 37.98 KG/M2 | HEART RATE: 72 BPM | OXYGEN SATURATION: 97 % | HEIGHT: 67 IN

## 2021-02-09 DIAGNOSIS — Z95.828 PRESENCE OF STENT IN ARTERY: ICD-10-CM

## 2021-02-09 DIAGNOSIS — E66.01 SEVERE OBESITY (HCC): ICD-10-CM

## 2021-02-09 DIAGNOSIS — E78.00 PURE HYPERCHOLESTEROLEMIA: ICD-10-CM

## 2021-02-09 DIAGNOSIS — I25.10 ATHEROSCLEROSIS OF NATIVE CORONARY ARTERY OF NATIVE HEART WITHOUT ANGINA PECTORIS: ICD-10-CM

## 2021-02-09 DIAGNOSIS — E55.9 VITAMIN D DEFICIENCY: ICD-10-CM

## 2021-02-09 DIAGNOSIS — M25.571 CHRONIC PAIN OF BOTH ANKLES: Primary | ICD-10-CM

## 2021-02-09 DIAGNOSIS — I10 ESSENTIAL HYPERTENSION: ICD-10-CM

## 2021-02-09 DIAGNOSIS — M25.572 CHRONIC PAIN OF BOTH ANKLES: Primary | ICD-10-CM

## 2021-02-09 DIAGNOSIS — K21.9 GASTROESOPHAGEAL REFLUX DISEASE WITHOUT ESOPHAGITIS: ICD-10-CM

## 2021-02-09 DIAGNOSIS — I87.2 VENOUS INSUFFICIENCY: ICD-10-CM

## 2021-02-09 DIAGNOSIS — G89.29 CHRONIC PAIN OF BOTH ANKLES: Primary | ICD-10-CM

## 2021-02-09 PROCEDURE — 99214 OFFICE O/P EST MOD 30 MIN: CPT | Performed by: INTERNAL MEDICINE

## 2021-02-09 RX ORDER — ALCOHOL 2.38 KG/3.79L
GEL TOPICAL
COMMUNITY
End: 2021-12-30

## 2021-02-09 RX ORDER — PREGABALIN 150 MG/1
150 CAPSULE ORAL 2 TIMES DAILY
COMMUNITY
Start: 2021-01-30 | End: 2021-12-30 | Stop reason: SDDI

## 2021-02-09 NOTE — LETTER
2021 9:21 AM 
 
Ms. Grey Fraga [phone] : 1960 Please send last office note on this patient to [phone]. Thank you, [facility]

## 2021-02-09 NOTE — PROGRESS NOTES
Rosa Maria Hightower is a 64 y.o. female and presents with Follow Up Chronic Condition (htn, lipids, )    Ms. Leonardo Mckoy came for regular follow-up in the interval time, she consulted podiatrist Dr. Bernie Rahman according to her history it looks like she underwent, nerve conduction study she has been started on pregabalin for the leg pain and foot pain, she has consulted cardiologist who has, done venous Doppler and going for venous occlusion surgery in March she is wearing compression stockings she has good circulation in both dorsalis pedis. She has quit smoking cigarette for last, 3 to 4 years after she had coronary artery blockage she has stent, implant. She stayed out of work until 18th January now she has much less pain in both ankle and legs, today her blood pressure is controlled and she is off from amlodipine, she is otherwise compliant, for medications. She had history of vitamin D deficiency in the past.,  Last blood work was done in October 2020. No negative thoughts or depression. No anxiety spells. Her BMI is 37.9. Review of Systems    Review of Systems   Constitutional: Negative. HENT: Negative for sinus pain and sore throat. Eyes: Negative for blurred vision. Respiratory: Negative for cough and wheezing. Cardiovascular: Negative. Gastrointestinal: Negative. Genitourinary: Negative. Musculoskeletal: Negative. Feeling better in rt foot. started working since [facility] [address] DR blankenship /podiatrist.   Neurological: Negative for dizziness, tingling, sensory change and headaches. Psychiatric/Behavioral: Negative for depression, memory loss and substance abuse. The patient does not have insomnia.          Past Medical History:   Diagnosis Date    CAD (coronary artery disease)     Hypercholesterolemia     Hypertension      Past Surgical History:   Procedure Laterality Date    HX COLONOSCOPY  08/28/2017    HX CORONARY STENT PLACEMENT      HX PARTIAL HYSTERECTOMY       Social History Socioeconomic History    Marital status:      Spouse name: Not on file    Number of children: Not on file    Years of education: Not on file    Highest education level: Not on file   Tobacco Use    Smoking status: Former Smoker    Smokeless tobacco: Never Used   Substance and Sexual Activity    Alcohol use: Yes     Comment: occ     Family History   Problem Relation Age of Onset    Arthritis-osteo Mother     Diabetes Father      Current Outpatient Medications   Medication Sig Dispense Refill    pregabalin (LYRICA) 150 mg capsule Take 150 mg by mouth two (2) times a day.  levomefolate-B6-meB12-algal oil (Metanx, algal oil,) 3 mg-35 mg-2 mg -90.314 mg cap Take  by mouth.  L-Mfolate-B6 Phos-Methyl-B12 (METANX) 3-35-2 mg tab tab Take 1 Tab by mouth daily.  losartan-hydroCHLOROthiazide (HYZAAR) 50-12.5 mg per tablet Take 1 Tab by mouth daily.  ADULT LOW DOSE ASPIRIN PO Take 1 Tab by mouth daily.  atorvastatin (LIPITOR) 40 mg tablet Take 1 Tab by mouth daily.  folic acid/multivit-min/lutein (CENTRUM SILVER PO) Take 1 Tab by mouth daily. No Known Allergies    Objective:  Visit Vitals  /74 (BP 1 Location: Left upper arm, BP Patient Position: Sitting)   Pulse 72   Resp 18   Ht 5' 7\" (1.702 m)   Wt 242 lb (109.8 kg)   SpO2 97%   BMI 37.90 kg/m²       Physical Exam:   Constitutional: General Appearance: Pleasant. Level of Distress: NAD. Psychiatric: Mental Status: normal mood and affect Orientation: to time, place, and person. ,normal eye contact. Head: Head: normocephalic and atraumatic. Eyes: Pupils: PERRLA. Sclerae: non-icteric. Neck: Neck: supple, trachea midline, and no masses. Lymph Nodes: no cervical LAD. Thyroid: no enlargement or nodules and non-tender. Lungs: Respiratory effort: no dyspnea. Auscultation: no wheezing, rales/crackles, or rhonchi and breath sounds normal and good air movement. Cardiovascular: Apical Impulse: not displaced.  Heart Auscultation: normal S1 and S2; no murmurs, rubs, or gallops; and RRR. Neck vessels: no carotid bruits. Pulses including femoral / pedal: normal throughout. Abdomen: Bowel Sounds: normal. Inspection and Palpation: no tenderness, guarding, or masses and soft and non-distended. Liver: non-tender and no hepatomegaly. Spleen: non-tender and no splenomegaly. Musculoskeletal[de-identified] Extremities: no edema,no varicosities. No Calf tenderness. Neurologic: Gait and Station: normal gait and station. Motor Strength normal right and left. Skin: Inspection and palpation: no rash, lesions, or ulcer. Results for orders placed or performed during the hospital encounter of 10/28/20   CBC WITH AUTOMATED DIFF   Result Value Ref Range    WBC 6.7 3.6 - 11.0 K/uL    RBC 5.13 3.80 - 5.20 M/uL    HGB 13.9 11.5 - 16.0 g/dL    HCT 44.4 35.0 - 47.0 %    MCV 86.5 80.0 - 99.0 FL    MCH 27.1 26.0 - 34.0 PG    MCHC 31.3 30.0 - 36.5 g/dL    RDW 13.7 11.5 - 14.5 %    PLATELET 357 543 - 914 K/uL    MPV 11.9 8.9 - 12.9 FL    NRBC 0.7 (H) 0  WBC    ABSOLUTE NRBC 0.05 (H) 0.00 - 0.01 K/uL    NEUTROPHILS 41 32 - 75 %    LYMPHOCYTES 49 12 - 49 %    MONOCYTES 7 5 - 13 %    EOSINOPHILS 3 0 - 7 %    BASOPHILS 0 0 - 1 %    IMMATURE GRANULOCYTES 0 0.0 - 0.5 %    ABS. NEUTROPHILS 2.8 1.8 - 8.0 K/UL    ABS. LYMPHOCYTES 3.4 0.8 - 3.5 K/UL    ABS. MONOCYTES 0.4 0.0 - 1.0 K/UL    ABS. EOSINOPHILS 0.2 0.0 - 0.4 K/UL    ABS. BASOPHILS 0.0 0.0 - 0.1 K/UL    ABS. IMM.  GRANS. 0.0 0.00 - 0.04 K/UL    DF AUTOMATED     METABOLIC PANEL, COMPREHENSIVE   Result Value Ref Range    Sodium 142 136 - 145 mmol/L    Potassium 4.5 3.5 - 5.1 mmol/L    Chloride 108 97 - 108 mmol/L    CO2 27 21 - 32 mmol/L    Anion gap 7 5 - 15 mmol/L    Glucose 82 65 - 100 mg/dL    BUN 12 6 - 20 mg/dL    Creatinine 0.94 0.55 - 1.02 mg/dL    BUN/Creatinine ratio 13 12 - 20      GFR est AA >60 >60 ml/min/1.73m2    GFR est non-AA >60 >60 ml/min/1.73m2    Calcium 9.6 8.5 - 10.1 mg/dL Bilirubin, total 0.4 0.2 - 1.0 mg/dL    AST (SGOT) 16 15 - 37 U/L    ALT (SGPT) 20 12 - 78 U/L    Alk. phosphatase 92 45 - 117 U/L    Protein, total 8.0 6.4 - 8.2 g/dL    Albumin 4.0 3.5 - 5.0 g/dL    Globulin 4.0 2.0 - 4.0 g/dL    A-G Ratio 1.0 (L) 1.1 - 2.2     BNP   Result Value Ref Range    NT pro-BNP 31 <125 pg/mL   TROPONIN I   Result Value Ref Range    Troponin-I, Qt. <0.05 <0.05 ng/mL       Assessment/Plan:      ICD-10-CM ICD-9-CM    1. Chronic pain of both ankles  M25.571 719.47     G89.29 338.29     M25.572     2. Essential hypertension  I10 401.9 CBC WITH AUTOMATED DIFF      METABOLIC PANEL, COMPREHENSIVE      TSH 3RD GENERATION   3. Pure hypercholesterolemia  E78.00 272.0 LIPID PANEL   4. Venous insufficiency  I87.2 459.81    5. Gastroesophageal reflux disease without esophagitis  K21.9 530.81    6. Atherosclerosis of native coronary artery of native heart without angina pectoris  I25.10 414.01    7. Presence of stent in artery  Z95.828 V49.89    8. Severe obesity ([address]  E66.01 278.01    9. Vitamin D deficiency  E55.9 268.9 VITAMIN D, 25 HYDROXY     Orders Placed This Encounter    CBC WITH AUTOMATED DIFF    METABOLIC PANEL, COMPREHENSIVE    LIPID PANEL    TSH 3RD GENERATION    VITAMIN D, 25 HYDROXY    pregabalin (LYRICA) 150 mg capsule     Sig: Take 150 mg by mouth two (2) times a day.  levomefolate-B6-meB12-algal oil (Metanx, algal oil,) 3 mg-35 mg-2 mg -90.314 mg cap     Sig: Take  by mouth.  L-Mfolate-B6 Phos-Methyl-B12 (METANX) 3-35-2 mg tab tab     Sig: Take 1 Tab by mouth daily. Hypertension controlled. She is off from amlodipine and continued on losartan hydrochlorothiazide 50/12.5 mg once a day. Diet low in sodium. Hypercholesterolemia with CAD s/p stent. Continued on atorvastatin 40 mg at bedtime.   She has been admired for not smoking cigarette that she has stopped almost for last more than 2 years diet less in fat, according to her she is eating baked food.    , Morbid obesity with BMI 37.9 I have given her diet plan of 1400-calorie diet plan and walking minimum 150 minutes/week. Neuropathy etiology unclear and history of ankle pain she has been started on pregabalin 150 mg twice a day by podiatrist Dr. Jaynee Dakins, I am awaiting the notes from Dr. Jaynee Dakins. She is off from gabapentin. Vitamin D deficiency  she had taken vitamin D once a week in the past , labs ordered. Venous insufficiency wearing compression stockings and going to have venous occlusion surgery by Dr. Hardy Toth her cardiologist,. I will register her to get COVID-19 vaccine and she has started resuming back to work since 19th January. Fasting labs ordered. continue present plan, routine labs ordered, call if any problems    There are no Patient Instructions on file for this visit. Follow-up and Dispositions    · Return in about 3 months (around 5/9/2021) for htn ,ai ,vitd . fasting labs today.

## 2021-10-05 ENCOUNTER — TELEPHONE (OUTPATIENT)
Dept: INTERNAL MEDICINE CLINIC | Age: 61
End: 2021-10-05

## 2021-10-05 DIAGNOSIS — E78.00 PURE HYPERCHOLESTEROLEMIA: ICD-10-CM

## 2021-10-05 DIAGNOSIS — I10 ESSENTIAL HYPERTENSION: ICD-10-CM

## 2021-10-05 DIAGNOSIS — E55.9 VITAMIN D DEFICIENCY: ICD-10-CM

## 2021-10-05 DIAGNOSIS — I25.10 ATHEROSCLEROSIS OF NATIVE CORONARY ARTERY OF NATIVE HEART WITHOUT ANGINA PECTORIS: ICD-10-CM

## 2021-10-05 NOTE — TELEPHONE ENCOUNTER
Patient called stating that she needs to get her blood work done but the order she was given has     Wants to know if you can put another order in so she can get her blood work before her next appointment.

## 2021-12-30 ENCOUNTER — OFFICE VISIT (OUTPATIENT)
Dept: INTERNAL MEDICINE CLINIC | Age: 61
End: 2021-12-30
Payer: COMMERCIAL

## 2021-12-30 VITALS
DIASTOLIC BLOOD PRESSURE: 70 MMHG | SYSTOLIC BLOOD PRESSURE: 120 MMHG | WEIGHT: 237 LBS | RESPIRATION RATE: 12 BRPM | BODY MASS INDEX: 37.2 KG/M2 | OXYGEN SATURATION: 99 % | HEIGHT: 67 IN | HEART RATE: 72 BPM

## 2021-12-30 DIAGNOSIS — E78.00 PURE HYPERCHOLESTEROLEMIA: ICD-10-CM

## 2021-12-30 DIAGNOSIS — Z12.31 SCREENING MAMMOGRAM FOR BREAST CANCER: ICD-10-CM

## 2021-12-30 DIAGNOSIS — E55.9 VITAMIN D DEFICIENCY: ICD-10-CM

## 2021-12-30 DIAGNOSIS — I10 ESSENTIAL HYPERTENSION: ICD-10-CM

## 2021-12-30 DIAGNOSIS — I25.10 ATHEROSCLEROSIS OF NATIVE CORONARY ARTERY OF NATIVE HEART WITHOUT ANGINA PECTORIS: ICD-10-CM

## 2021-12-30 DIAGNOSIS — E66.01 MORBID OBESITY (HCC): ICD-10-CM

## 2021-12-30 DIAGNOSIS — K21.9 GASTROESOPHAGEAL REFLUX DISEASE WITHOUT ESOPHAGITIS: ICD-10-CM

## 2021-12-30 DIAGNOSIS — Z95.828 PRESENCE OF STENT IN ARTERY: ICD-10-CM

## 2021-12-30 PROBLEM — G57.93 NEUROPATHY OF BOTH FEET: Status: ACTIVE | Noted: 2021-12-30

## 2021-12-30 PROCEDURE — 99214 OFFICE O/P EST MOD 30 MIN: CPT | Performed by: INTERNAL MEDICINE

## 2021-12-30 RX ORDER — OMEPRAZOLE 40 MG/1
40 CAPSULE, DELAYED RELEASE ORAL DAILY
Qty: 90 CAPSULE | Refills: 0 | Status: SHIPPED | OUTPATIENT
Start: 2021-12-30 | End: 2022-06-15 | Stop reason: SDUPTHER

## 2021-12-30 RX ORDER — ATORVASTATIN CALCIUM 40 MG/1
40 TABLET, FILM COATED ORAL DAILY
Qty: 90 TABLET | Refills: 0 | Status: SHIPPED | OUTPATIENT
Start: 2021-12-30 | End: 2022-06-15 | Stop reason: SDUPTHER

## 2021-12-30 RX ORDER — LOSARTAN POTASSIUM AND HYDROCHLOROTHIAZIDE 12.5; 5 MG/1; MG/1
1 TABLET ORAL EVERY MORNING
Qty: 90 TABLET | Refills: 0 | Status: SHIPPED | OUTPATIENT
Start: 2021-12-30 | End: 2022-06-15 | Stop reason: SDUPTHER

## 2021-12-30 NOTE — PROGRESS NOTES
Lila Hopkins is a 63 y.o. female and presents with Follow Up Chronic Condition, Cholesterol Problem, and Hypertension      Ms. Kalpesh Gaxiola came for regular follow-up after a long time. In the past she had history of both ankle pain and  foot pain and she was started on pregabalin but she told me now she has no pain she stopped taking pregabalin in the past she had vitamin D deficiency. ,  She has quit smoking cigarette and trying to eat healthy diet she knows to eat cucumber but cannot digest causing sometimes belching. Since she has CAD she is not smoking cigarette. Her blood pressure is controlled. She has not done mammogram or colonoscopy but she  agreed to do mammogram first and does not want to have order for Cologuard or colonoscopy. She has taken 1 dose of [employer] vaccine. Her BMI is 37.12. She has not done labs because of financial  problems and she told me she will do on Monday. she ran out of omeprazole. Needs refills. Pleasant  Today her blood pressure is controlled with current medication regimen. Review of Systems    Review of Systems   Constitutional: Negative. HENT: Negative for congestion and sore throat. Eyes: Negative for blurred vision. Respiratory: Negative for cough and wheezing. Cardiovascular: Negative. Gastrointestinal: Negative. Genitourinary: Negative for dysuria and frequency. Musculoskeletal: Negative. Neurological: Negative for dizziness, tremors, speech change and focal weakness. Psychiatric/Behavioral: Negative for depression, hallucinations, substance abuse and suicidal ideas. The patient is not nervous/anxious and does not have insomnia.          Past Medical History:   Diagnosis Date    CAD (coronary artery disease)     Hypercholesterolemia     Hypertension      Past Surgical History:   Procedure Laterality Date    HX COLONOSCOPY  08/28/2017    HX CORONARY STENT PLACEMENT      HX PARTIAL HYSTERECTOMY       Social History Socioeconomic History    Marital status:    Tobacco Use    Smoking status: Former Smoker     Packs/day: 0.25     Years: 30.00     Pack years: 7.50     Quit date:      Years since quittin.9    Smokeless tobacco: Never Used   Substance and Sexual Activity    Alcohol use: Yes     Comment: occ     Family History   Problem Relation Age of Onset    OSTEOARTHRITIS Mother     Diabetes Father      Current Outpatient Medications   Medication Sig Dispense Refill    atorvastatin (LIPITOR) 40 mg tablet Take 1 Tablet by mouth daily. 90 Tablet 0    losartan-hydroCHLOROthiazide (HYZAAR) 50-12.5 mg per tablet Take 1 Tablet by mouth Every morning. 90 Tablet 0    omeprazole (PRILOSEC) 40 mg capsule Take 1 Capsule by mouth daily. Take 30 minutes before dinner once a day. 90 Capsule 0    ADULT LOW DOSE ASPIRIN PO Take 1 Tab by mouth daily.  folic acid/multivit-min/lutein (CENTRUM SILVER PO) Take 1 Tab by mouth daily. No Known Allergies    Objective:  Visit Vitals  /70 (BP 1 Location: Right upper arm, BP Patient Position: Sitting, BP Cuff Size: Large adult)   Pulse 72   Resp 12   Ht 5' 7\" (1.702 m)   Wt 237 lb (107.5 kg)   SpO2 99%   BMI 37.12 kg/m²       Physical Exam:   Constitutional: General Appearance: Pleasant. Level of Distress: NAD. Psychiatric: Mental Status: normal mood and affect Orientation: to time, place, and person. ,normal eye contact. Head: Head: normocephalic and atraumatic. Eyes: Pupils: PERRLA. Sclerae: non-icteric. Neck: Neck: supple, trachea midline, and no masses. Lymph Nodes: no cervical LAD. Thyroid: no enlargement or nodules and non-tender. Lungs: Respiratory effort: no dyspnea. Auscultation: no wheezing, rales/crackles, or rhonchi and breath sounds normal and good air movement. Cardiovascular: Apical Impulse: not displaced. Heart Auscultation: normal S1 and S2; no murmurs, rubs, or gallops; and RRR. Neck vessels: no carotid bruits.  Pulses including femoral / pedal: normal throughout. Abdomen: Bowel Sounds: normal. Inspection and Palpation: no tenderness, guarding, or masses and soft and non-distended. Liver: non-tender and no hepatomegaly. Spleen: non-tender and no splenomegaly. Musculoskeletal[de-identified] Extremities: no edema,no varicosities. No Calf tenderness. Neurologic: Gait and Station: normal gait and station. Motor Strength normal right and left. Skin: Inspection and palpation: no rash, lesions, or ulcer. Results for orders placed or performed during the hospital encounter of 10/28/20   CBC WITH AUTOMATED DIFF   Result Value Ref Range    WBC 6.7 3.6 - 11.0 K/uL    RBC 5.13 3.80 - 5.20 M/uL    HGB 13.9 11.5 - 16.0 g/dL    HCT 44.4 35.0 - 47.0 %    MCV 86.5 80.0 - 99.0 FL    MCH 27.1 26.0 - 34.0 PG    MCHC 31.3 30.0 - 36.5 g/dL    RDW 13.7 11.5 - 14.5 %    PLATELET 452 314 - 461 K/uL    MPV 11.9 8.9 - 12.9 FL    NRBC 0.7 (H) 0  WBC    ABSOLUTE NRBC 0.05 (H) 0.00 - 0.01 K/uL    NEUTROPHILS 41 32 - 75 %    LYMPHOCYTES 49 12 - 49 %    MONOCYTES 7 5 - 13 %    EOSINOPHILS 3 0 - 7 %    BASOPHILS 0 0 - 1 %    IMMATURE GRANULOCYTES 0 0.0 - 0.5 %    ABS. NEUTROPHILS 2.8 1.8 - 8.0 K/UL    ABS. LYMPHOCYTES 3.4 0.8 - 3.5 K/UL    ABS. MONOCYTES 0.4 0.0 - 1.0 K/UL    ABS. EOSINOPHILS 0.2 0.0 - 0.4 K/UL    ABS. BASOPHILS 0.0 0.0 - 0.1 K/UL    ABS. IMM. GRANS. 0.0 0.00 - 0.04 K/UL    DF AUTOMATED     METABOLIC PANEL, COMPREHENSIVE   Result Value Ref Range    Sodium 142 136 - 145 mmol/L    Potassium 4.5 3.5 - 5.1 mmol/L    Chloride 108 97 - 108 mmol/L    CO2 27 21 - 32 mmol/L    Anion gap 7 5 - 15 mmol/L    Glucose 82 65 - 100 mg/dL    BUN 12 6 - 20 mg/dL    Creatinine 0.94 0.55 - 1.02 mg/dL    BUN/Creatinine ratio 13 12 - 20      GFR est AA >60 >60 ml/min/1.73m2    GFR est non-AA >60 >60 ml/min/1.73m2    Calcium 9.6 8.5 - 10.1 mg/dL    Bilirubin, total 0.4 0.2 - 1.0 mg/dL    AST (SGOT) 16 15 - 37 U/L    ALT (SGPT) 20 12 - 78 U/L    Alk.  phosphatase 92 45 - 117 U/L    Protein, total 8.0 6.4 - 8.2 g/dL    Albumin 4.0 3.5 - 5.0 g/dL    Globulin 4.0 2.0 - 4.0 g/dL    A-G Ratio 1.0 (L) 1.1 - 2.2     BNP   Result Value Ref Range    NT pro-BNP 31 <125 pg/mL   TROPONIN I   Result Value Ref Range    Troponin-I, Qt. <0.05 <0.05 ng/mL       Assessment/Plan:      ICD-10-CM ICD-9-CM    1. Essential hypertension  I10 401.9 CBC WITH AUTOMATED DIFF      METABOLIC PANEL, COMPREHENSIVE      TSH 3RD GENERATION   2. Pure hypercholesterolemia  E78.00 272.0 LIPID PANEL   3. Atherosclerosis of native coronary artery of native heart without angina pectoris  I25.10 414.01    4. Vitamin D deficiency  E55.9 268.9 VITAMIN D, 25 HYDROXY   5. Gastroesophageal reflux disease without esophagitis  K21.9 530.81    6. Morbid obesity ([facility] [address]  E66.01 278.01    7. Presence of stent in artery  Z95.828 V49.89    8. BMI 37.0-37.9, adult  Z68.37 V85.37    9. Screening mammogram for breast cancer  Z12.31 V76.12 CHRISTOPHER 3D MARCELO W MAMMO BI SCREENING INCL CAD     Orders Placed This Encounter    CHRISTOPHER 3D MARCELO W MAMMO BI SCREENING INCL CAD     Standing Status:   Future     Standing Expiration Date:   1/30/2022    CBC WITH AUTOMATED DIFF    METABOLIC PANEL, COMPREHENSIVE    LIPID PANEL    TSH 3RD GENERATION    VITAMIN D, 25 HYDROXY    atorvastatin (LIPITOR) 40 mg tablet     Sig: Take 1 Tablet by mouth daily. Dispense:  90 Tablet     Refill:  0    losartan-hydroCHLOROthiazide (HYZAAR) 50-12.5 mg per tablet     Sig: Take 1 Tablet by mouth Every morning. Dispense:  90 Tablet     Refill:  0    omeprazole (PRILOSEC) 40 mg capsule     Sig: Take 1 Capsule by mouth daily. Take 30 minutes before dinner once a day. Dispense:  90 Capsule     Refill:  0     Hypertension controlled. Continued on losartan hydrochlorothiazide 50/12.5 mg once a day with diet low in sodium. Hypercholesteremia continued on atorvastatin 40 mg at bedtime labs ordered and diet low in fat and healthy lifestyle with cardiac diet.     CAD s/p stent she has quit smoking cigarette for last few years since she had a stent placement and she has good control of blood pressure and she takes statin and low-dose of aspirin. I ordered labs,in the past she used to see cardiologist.    GERD taking omeprazole. Explained that once I get the baseline vitamin D level Vitamin D deficiency no baseline vitamin D level available, we will determine the dose of vitamin D whether she needs prescription or OTC medicine. History of ankle pain and foot pain and history of questionable neuropathy in feet she stopped taking pregabalin she told me that now she has no more pain. She has taken 1 dose of [employer] vaccine. Recommended to get the second dose of modern or Pfizer and booster dose later on. She agreed. Health maintenance screening mammogram ordered. She wants to postpone to do colonoscopy or Cologuard. Recommended to get Shingrix vaccine but she told me she does not want to take now also recommended to have Pneumovax 23. She wants to do labs on Monday morning and refills given. Follow-up in 3 months. Last time I had seen her in November 2020 she has not done any labs since October 2020. educated to monitor her labs periodically. follow low fat diet, follow low salt diet, continue present plan, routine labs ordered, call if any problems    There are no Patient Instructions on file for this visit. Follow-up and Dispositions    · Return in about 3 months (around 3/30/2022) for follow up for chronic conditionlab on monday.

## 2021-12-30 NOTE — PROGRESS NOTES
Chief Complaint   Patient presents with    Follow Up Chronic Condition    Cholesterol Problem    Hypertension     Visit Vitals  /76 (BP 1 Location: Left upper arm, BP Patient Position: Sitting, BP Cuff Size: Adult)   Pulse 84   Resp 12   Ht 5' 7\" (1.702 m)   Wt 237 lb (107.5 kg)   SpO2 99%   BMI 37.12 kg/m²     1. Have you been to the ER, urgent care clinic since your last visit? Hospitalized since your last visit? [facility] In[facility]     2. Have you seen or consulted any other health care providers outside of the [address] [place] [address] since your last visit? Include any pap smears or colon screening.  No

## 2022-02-14 ENCOUNTER — HOSPITAL ENCOUNTER (EMERGENCY)
Age: 62
Discharge: HOME | End: 2022-02-14
Payer: COMMERCIAL

## 2022-02-14 ENCOUNTER — APPOINTMENT (OUTPATIENT)
Dept: GENERAL RADIOLOGY | Age: 62
End: 2022-02-14
Attending: NURSE PRACTITIONER
Payer: COMMERCIAL

## 2022-02-14 VITALS
DIASTOLIC BLOOD PRESSURE: 91 MMHG | BODY MASS INDEX: 33.59 KG/M2 | WEIGHT: 214 LBS | RESPIRATION RATE: 20 BRPM | TEMPERATURE: 98.8 F | HEART RATE: 83 BPM | OXYGEN SATURATION: 100 % | SYSTOLIC BLOOD PRESSURE: 187 MMHG | HEIGHT: 67 IN

## 2022-02-14 DIAGNOSIS — M25.512 LEFT ANTERIOR SHOULDER PAIN: Primary | ICD-10-CM

## 2022-02-14 LAB
ANION GAP SERPL CALC-SCNC: 11 MMOL/L (ref 5–15)
ATRIAL RATE: 87 BPM
BASOPHILS # BLD: 0 K/UL (ref 0–0.1)
BASOPHILS NFR BLD: 0 % (ref 0–1)
BUN SERPL-MCNC: 12 MG/DL (ref 6–20)
BUN/CREAT SERPL: 14 (ref 12–20)
CA-I BLD-MCNC: 9.4 MG/DL (ref 8.5–10.1)
CALCULATED P AXIS, ECG09: 65 DEGREES
CALCULATED R AXIS, ECG10: 9 DEGREES
CALCULATED T AXIS, ECG11: 63 DEGREES
CHLORIDE SERPL-SCNC: 106 MMOL/L (ref 97–108)
CO2 SERPL-SCNC: 25 MMOL/L (ref 21–32)
CREAT SERPL-MCNC: 0.85 MG/DL (ref 0.55–1.02)
DIAGNOSIS, 93000: NORMAL
DIFFERENTIAL METHOD BLD: NORMAL
EOSINOPHIL # BLD: 0.1 K/UL (ref 0–0.4)
EOSINOPHIL NFR BLD: 1 % (ref 0–7)
ERYTHROCYTE [DISTWIDTH] IN BLOOD BY AUTOMATED COUNT: 13 % (ref 11.5–14.5)
GLUCOSE SERPL-MCNC: 90 MG/DL (ref 65–100)
HCT VFR BLD AUTO: 41 % (ref 35–47)
HGB BLD-MCNC: 13 G/DL (ref 11.5–16)
IMM GRANULOCYTES # BLD AUTO: 0 K/UL (ref 0–0.04)
IMM GRANULOCYTES NFR BLD AUTO: 0 % (ref 0–0.5)
LYMPHOCYTES # BLD: 2.6 K/UL (ref 0.8–3.5)
LYMPHOCYTES NFR BLD: 32 % (ref 12–49)
MCH RBC QN AUTO: 27.3 PG (ref 26–34)
MCHC RBC AUTO-ENTMCNC: 31.7 G/DL (ref 30–36.5)
MCV RBC AUTO: 86.1 FL (ref 80–99)
MONOCYTES # BLD: 0.6 K/UL (ref 0–1)
MONOCYTES NFR BLD: 8 % (ref 5–13)
NEUTS SEG # BLD: 4.8 K/UL (ref 1.8–8)
NEUTS SEG NFR BLD: 59 % (ref 32–75)
P-R INTERVAL, ECG05: 166 MS
PLATELET # BLD AUTO: 188 K/UL (ref 150–400)
PMV BLD AUTO: 12 FL (ref 8.9–12.9)
POTASSIUM SERPL-SCNC: 4 MMOL/L (ref 3.5–5.1)
Q-T INTERVAL, ECG07: 370 MS
QRS DURATION, ECG06: 88 MS
QTC CALCULATION (BEZET), ECG08: 445 MS
RBC # BLD AUTO: 4.76 M/UL (ref 3.8–5.2)
SODIUM SERPL-SCNC: 142 MMOL/L (ref 136–145)
TROPONIN-HIGH SENSITIVITY: 6 NG/L (ref 0–51)
VENTRICULAR RATE, ECG03: 87 BPM
WBC # BLD AUTO: 8.1 K/UL (ref 3.6–11)

## 2022-02-14 PROCEDURE — 84484 ASSAY OF TROPONIN QUANT: CPT

## 2022-02-14 PROCEDURE — 96372 THER/PROPH/DIAG INJ SC/IM: CPT

## 2022-02-14 PROCEDURE — 93005 ELECTROCARDIOGRAM TRACING: CPT

## 2022-02-14 PROCEDURE — 99283 EMERGENCY DEPT VISIT LOW MDM: CPT

## 2022-02-14 PROCEDURE — 85025 COMPLETE CBC W/AUTO DIFF WBC: CPT

## 2022-02-14 PROCEDURE — 80048 BASIC METABOLIC PNL TOTAL CA: CPT

## 2022-02-14 PROCEDURE — 71045 X-RAY EXAM CHEST 1 VIEW: CPT

## 2022-02-14 PROCEDURE — 74011250636 HC RX REV CODE- 250/636: Performed by: NURSE PRACTITIONER

## 2022-02-14 RX ORDER — PREDNISONE 20 MG/1
TABLET ORAL
Qty: 18 TABLET | Refills: 0 | Status: SHIPPED | OUTPATIENT
Start: 2022-02-14 | End: 2022-05-21

## 2022-02-14 RX ORDER — HYDROCODONE BITARTRATE AND ACETAMINOPHEN 5; 325 MG/1; MG/1
1 TABLET ORAL
Qty: 12 TABLET | Refills: 0 | Status: SHIPPED | OUTPATIENT
Start: 2022-02-14 | End: 2022-02-17

## 2022-02-14 RX ORDER — DICLOFENAC SODIUM 10 MG/G
GEL TOPICAL 4 TIMES DAILY
Qty: 20 G | Refills: 0 | Status: SHIPPED | OUTPATIENT
Start: 2022-02-14 | End: 2022-06-15 | Stop reason: ALTCHOICE

## 2022-02-14 RX ORDER — KETOROLAC TROMETHAMINE 30 MG/ML
30 INJECTION, SOLUTION INTRAMUSCULAR; INTRAVENOUS
Status: COMPLETED | OUTPATIENT
Start: 2022-02-14 | End: 2022-02-14

## 2022-02-14 RX ADMIN — KETOROLAC TROMETHAMINE 30 MG: 30 INJECTION, SOLUTION INTRAMUSCULAR at 17:36

## 2022-02-14 NOTE — ED PROVIDER NOTES
EMERGENCY DEPARTMENT HISTORY AND PHYSICAL EXAM      Date: 2/14/2022  Patient Name: Jean-Claude Reyes    History of Presenting Illness     Chief Complaint   Patient presents with    Shoulder Pain    Arm Pain       History Provided By: Patient    HPI: Jean-Claude Reyes, 63 y.o. female with a past medical history significant hypertension, hyperlipidemia, obesity and myocardial infarction presents to the ED with cc of shoulder pain. Patient states she woke up with shoulder pain this morning. Patient denies any injury. Patient does have a history of a heart stent 3 years ago. Patient denies any chest pain or shortness of breath. Moderate severity, no known exacerbating or relieving factors, no other associated signs and symptoms    There are no other complaints, changes, or physical findings at this time. PCP: Lukas Haddad MD    No current facility-administered medications on file prior to encounter. Current Outpatient Medications on File Prior to Encounter   Medication Sig Dispense Refill    atorvastatin (LIPITOR) 40 mg tablet Take 1 Tablet by mouth daily. 90 Tablet 0    losartan-hydroCHLOROthiazide (HYZAAR) 50-12.5 mg per tablet Take 1 Tablet by mouth Every morning. 90 Tablet 0    omeprazole (PRILOSEC) 40 mg capsule Take 1 Capsule by mouth daily. Take 30 minutes before dinner once a day. 90 Capsule 0    ADULT LOW DOSE ASPIRIN PO Take 1 Tab by mouth daily.  folic acid/multivit-min/lutein (CENTRUM SILVER PO) Take 1 Tab by mouth daily.          Past History     Past Medical History:  Past Medical History:   Diagnosis Date    CAD (coronary artery disease)     Hypercholesterolemia     Hypertension        Past Surgical History:  Past Surgical History:   Procedure Laterality Date    HX COLONOSCOPY  08/28/2017    HX CORONARY STENT PLACEMENT      HX PARTIAL HYSTERECTOMY         Family History:  Family History   Problem Relation Age of Onset    OSTEOARTHRITIS Mother     Diabetes Father        Social History:  Social History     Tobacco Use    Smoking status: Current Some Day Smoker     Packs/day: 0.25     Years: 30.00     Pack years: 7.50     Last attempt to quit: 2019     Years since quitting: 3.1    Smokeless tobacco: Never Used   Substance Use Topics    Alcohol use: Yes     Comment: occ    Drug use: Not on file       Allergies:  No Known Allergies      Review of Systems     Review of Systems   Constitutional: Negative for chills, fatigue and fever. HENT: Negative for congestion, sinus pressure and trouble swallowing. Eyes: Negative for photophobia and pain. Respiratory: Negative for cough and shortness of breath. Cardiovascular: Negative for chest pain and leg swelling. Gastrointestinal: Negative for abdominal pain, diarrhea, nausea and vomiting. Endocrine: Negative for polydipsia, polyphagia and polyuria. Genitourinary: Negative for decreased urine volume, difficulty urinating, dysuria, hematuria and urgency. Musculoskeletal: Positive for arthralgias. Negative for back pain, gait problem, myalgias and neck pain. Skin: Negative for pallor and rash. Allergic/Immunologic: Negative for environmental allergies and food allergies. Neurological: Negative for dizziness, facial asymmetry, speech difficulty, numbness and headaches. Hematological: Negative for adenopathy. Does not bruise/bleed easily. Psychiatric/Behavioral: Negative for agitation, self-injury and suicidal ideas. The patient is not nervous/anxious. Physical Exam     Physical Exam  Vitals and nursing note reviewed. Constitutional:       Appearance: Normal appearance. HENT:      Head: Atraumatic. Right Ear: Tympanic membrane and external ear normal.      Left Ear: Tympanic membrane and external ear normal.      Nose: Nose normal.      Mouth/Throat:      Mouth: Mucous membranes are dry. Eyes:      Extraocular Movements: Extraocular movements intact.       Pupils: Pupils are equal, round, and reactive to light. Cardiovascular:      Rate and Rhythm: Normal rate and regular rhythm. Pulses: Normal pulses. Heart sounds: Normal heart sounds. Pulmonary:      Breath sounds: Normal breath sounds. Abdominal:      General: Abdomen is flat. Palpations: Abdomen is soft. Musculoskeletal:      Left shoulder: Tenderness present. No swelling or deformity. Decreased range of motion. Cervical back: Normal range of motion and neck supple. Skin:     General: Skin is warm and dry. Capillary Refill: Capillary refill takes less than 2 seconds. Neurological:      General: No focal deficit present. Mental Status: She is alert and oriented to person, place, and time. Mental status is at baseline. Psychiatric:         Mood and Affect: Mood normal.         Behavior: Behavior normal.         Lab and Diagnostic Study Results     Labs -     Recent Results (from the past 12 hour(s))   METABOLIC PANEL, BASIC    Collection Time: 02/14/22  5:30 PM   Result Value Ref Range    Sodium 142 136 - 145 mmol/L    Potassium 4.0 3.5 - 5.1 mmol/L    Chloride 106 97 - 108 mmol/L    CO2 25 21 - 32 mmol/L    Anion gap 11 5 - 15 mmol/L    Glucose 90 65 - 100 mg/dL    BUN 12 6 - 20 mg/dL    Creatinine 0.85 0.55 - 1.02 mg/dL    BUN/Creatinine ratio 14 12 - 20      GFR est AA >60 >60 ml/min/1.73m2    GFR est non-AA >60 >60 ml/min/1.73m2    Calcium 9.4 8.5 - 10.1 mg/dL   CBC WITH AUTOMATED DIFF    Collection Time: 02/14/22  5:30 PM   Result Value Ref Range    WBC 8.1 3.6 - 11.0 K/uL    RBC 4.76 3.80 - 5.20 M/uL    HGB 13.0 11.5 - 16.0 g/dL    HCT 41.0 35.0 - 47.0 %    MCV 86.1 80.0 - 99.0 FL    MCH 27.3 26.0 - 34.0 PG    MCHC 31.7 30.0 - 36.5 g/dL    RDW 13.0 11.5 - 14.5 %    PLATELET 166 921 - 368 K/uL    MPV 12.0 8.9 - 12.9 FL    NEUTROPHILS 59 32 - 75 %    LYMPHOCYTES 32 12 - 49 %    MONOCYTES 8 5 - 13 %    EOSINOPHILS 1 0 - 7 %    BASOPHILS 0 0 - 1 %    IMMATURE GRANULOCYTES 0 0.0 - 0.5 %    ABS.  NEUTROPHILS 4.8 1.8 - 8.0 K/UL    ABS. LYMPHOCYTES 2.6 0.8 - 3.5 K/UL    ABS. MONOCYTES 0.6 0.0 - 1.0 K/UL    ABS. EOSINOPHILS 0.1 0.0 - 0.4 K/UL    ABS. BASOPHILS 0.0 0.0 - 0.1 K/UL    ABS. IMM. GRANS. 0.0 0.00 - 0.04 K/UL    DF AUTOMATED     TROPONIN-HIGH SENSITIVITY    Collection Time: 02/14/22  5:30 PM   Result Value Ref Range    Troponin-High Sensitivity 6 0 - 51 ng/L       Radiologic Studies -   @lastxrresult@  CT Results  (Last 48 hours)    None        CXR Results  (Last 48 hours)               02/14/22 1711  XR CHEST PORT Final result    Narrative:  Chest single view. Comparison single view chest August 23, 2017. Similar coarse reticular markings through lungs. Linear atelectasis left lower   lobe lung unchanged. Cardiac and mediastinal structures unchanged. Thoracic   aorta atherosclerosis. No pneumothorax or sizable pleural effusion. Medical Decision Making   - I am the first provider for this patient. - I reviewed the vital signs, available nursing notes, past medical history, past surgical history, family history and social history. - Initial assessment performed. The patients presenting problems have been discussed, and they are in agreement with the care plan formulated and outlined with them. I have encouraged them to ask questions as they arise throughout their visit. Vital Signs-Reviewed the patient's vital signs. Patient Vitals for the past 12 hrs:   Temp Pulse Resp BP SpO2   02/14/22 1640 98.8 °F (37.1 °C) 83 20 (!) 187/91 100 %       Records Reviewed: Nursing Notes and Old Medical Records          ED Course:          Provider Notes (Medical Decision Making):   patient presents with shoulder pain. Ddx: dislocation, fracture, strain, AC separation, clavicle fracture. Will get XR to further evaluate and treat the pain.       For the shoulder problem, she is discharged with a sling and asked to apply heat for 10-15 minutes four times a day followed by passive pendulum range of motion exercises to prevent frozen shoulder. She may use small weight in the hand if desired and tolerated. In the future, warm up and stretch the shoulder prior to exercising in a similar fashion. If any worsening symptoms, can follow up with Sports medicine. MDM       Procedures   Medical Decision Makingedical Decision Making  Performed by: Mortimer Arrow, NP  PROCEDURES:  Procedures       Disposition   Disposition: DC- Adult Discharges: All of the diagnostic tests were reviewed and questions answered. Diagnosis, care plan and treatment options were discussed. The patient understands the instructions and will follow up as directed. The patients results have been reviewed with them. They have been counseled regarding their diagnosis. The patient verbally convey understanding and agreement of the signs, symptoms, diagnosis, treatment and prognosis and additionally agrees to follow up as recommended with their PCP in 24 - 48 hours. They also agree with the care-plan and convey that all of their questions have been answered. I have also put together some discharge instructions for them that include: 1) educational information regarding their diagnosis, 2) how to care for their diagnosis at home, as well a 3) list of reasons why they would want to return to the ED prior to their follow-up appointment, should their condition change. Discharged    DISCHARGE PLAN:  1. Current Discharge Medication List      CONTINUE these medications which have NOT CHANGED    Details   atorvastatin (LIPITOR) 40 mg tablet Take 1 Tablet by mouth daily. Qty: 90 Tablet, Refills: 0      losartan-hydroCHLOROthiazide (HYZAAR) 50-12.5 mg per tablet Take 1 Tablet by mouth Every morning. Qty: 90 Tablet, Refills: 0      omeprazole (PRILOSEC) 40 mg capsule Take 1 Capsule by mouth daily. Take 30 minutes before dinner once a day. Qty: 90 Capsule, Refills: 0      ADULT LOW DOSE ASPIRIN PO Take 1 Tab by mouth daily.       folic acid/multivit-min/lutein (CENTRUM SILVER PO) Take 1 Tab by mouth daily. 2.   Follow-up Information     Follow up With Specialties Details Why Contact Info    John Ratliff MD Internal Medicine   [address] [place] [address]  [phone]      Pablo Pastor, [address] [place] [address] Orthopedic Surgery   Edi Valencia [place] [address]  [phone]          3. Return to ED if worse   4. Discharge Medication List as of 2/14/2022  6:23 PM      START taking these medications    Details   predniSONE (DELTASONE) 20 mg tablet 3 tabs for 3 days, 2 tabs for 3 days, 1 tab for 3 days, Normal, Disp-18 Tablet, R-0      diclofenac (VOLTAREN) 1 % gel Apply  to affected area four (4) times daily. , Normal, Disp-20 g, R-0      HYDROcodone-acetaminophen (Lorcet, HYDROcodone,) 5-325 mg per tablet Take 1 Tablet by mouth every six (6) hours as needed for Pain for up to 3 days. Max Daily Amount: 4 Tablets., Normal, Disp-12 Tablet, R-0         CONTINUE these medications which have NOT CHANGED    Details   atorvastatin (LIPITOR) 40 mg tablet Take 1 Tablet by mouth daily. , Normal, Disp-90 Tablet, R-0      losartan-hydroCHLOROthiazide (HYZAAR) 50-12.5 mg per tablet Take 1 Tablet by mouth Every morning., Normal, Disp-90 Tablet, R-0      omeprazole (PRILOSEC) 40 mg capsule Take 1 Capsule by mouth daily. Take 30 minutes before dinner once a day., Normal, Disp-90 Capsule, R-0      ADULT LOW DOSE ASPIRIN PO Take 1 Tab by mouth daily. , Historical Med      folic acid/multivit-min/lutein (CENTRUM SILVER PO) Take 1 Tab by mouth daily. , Historical Med               Diagnosis     Clinical Impression:   1. Left anterior shoulder pain        Attestations:    Bautista Granado NP    Please note that this dictation was completed with [employer], the [employer] voice recognition software.   Quite often unanticipated grammatical, syntax, homophones, and other interpretive errors are inadvertently transcribed by the computer software. Please disregard these errors. Please excuse any errors that have escaped final proofreading. Thank you.

## 2022-02-14 NOTE — ED TRIAGE NOTES
Pt to ED c/o pain to left shoulder radiating down to the left elbow.  Started today - no chest pains

## 2022-02-14 NOTE — Clinical Note
[address] [facility] EMERGENCY DEPARTMENT  [address] [id number] [phone]    Work/School Note    Date: 2/14/2022    To Whom It May concern:    Vito Suazo was seen and treated today in the emergency room by the following provider(s):  Nurse Practitioner: Heidi Granado NP. Vito Suazo is excused from work/school on 2/14/2022 through 2/16/2022. She is medically clear to return to work/school on 2/17/2022.          Sincerely,          Matheus Thomas NP

## 2022-02-28 PROBLEM — Z11.59 NEED FOR HEPATITIS C SCREENING TEST: Status: ACTIVE | Noted: 2022-02-28

## 2022-03-09 LAB
25(OH)D3+25(OH)D2 SERPL-MCNC: 13.6 NG/ML (ref 30–100)
ALBUMIN SERPL-MCNC: 4.2 G/DL (ref 3.8–4.9)
ALBUMIN/GLOB SERPL: 1.4 {RATIO} (ref 1.2–2.2)
ALP SERPL-CCNC: 83 IU/L (ref 44–121)
ALT SERPL-CCNC: 14 IU/L (ref 0–32)
AST SERPL-CCNC: 15 IU/L (ref 0–40)
BASOPHILS # BLD AUTO: 0 X10E3/UL (ref 0–0.2)
BASOPHILS NFR BLD AUTO: 0 %
BILIRUB SERPL-MCNC: 0.5 MG/DL (ref 0–1.2)
BUN SERPL-MCNC: 13 MG/DL (ref 6–24)
BUN/CREAT SERPL: 15 (ref 9–23)
CALCIUM SERPL-MCNC: 9.6 MG/DL (ref 8.7–10.2)
CHLORIDE SERPL-SCNC: 105 MMOL/L (ref 96–106)
CHOLEST SERPL-MCNC: 189 MG/DL (ref 100–199)
CO2 SERPL-SCNC: 21 MMOL/L (ref 20–29)
CREAT SERPL-MCNC: 0.87 MG/DL (ref 0.57–1)
EGFR: 77 ML/MIN/1.73
EOSINOPHIL # BLD AUTO: 0.1 X10E3/UL (ref 0–0.4)
EOSINOPHIL NFR BLD AUTO: 2 %
ERYTHROCYTE [DISTWIDTH] IN BLOOD BY AUTOMATED COUNT: 13.1 % (ref 11.7–15.4)
GLOBULIN SER CALC-MCNC: 2.9 G/DL (ref 1.5–4.5)
GLUCOSE SERPL-MCNC: 91 MG/DL (ref 65–99)
HCT VFR BLD AUTO: 42.2 % (ref 34–46.6)
HDLC SERPL-MCNC: 47 MG/DL
HGB BLD-MCNC: 13.2 G/DL (ref 11.1–15.9)
IMM GRANULOCYTES # BLD AUTO: 0 X10E3/UL (ref 0–0.1)
IMM GRANULOCYTES NFR BLD AUTO: 0 %
LDLC SERPL CALC-MCNC: 124 MG/DL (ref 0–99)
LYMPHOCYTES # BLD AUTO: 2.7 X10E3/UL (ref 0.7–3.1)
LYMPHOCYTES NFR BLD AUTO: 48 %
MCH RBC QN AUTO: 27.1 PG (ref 26.6–33)
MCHC RBC AUTO-ENTMCNC: 31.3 G/DL (ref 31.5–35.7)
MCV RBC AUTO: 87 FL (ref 79–97)
MONOCYTES # BLD AUTO: 0.4 X10E3/UL (ref 0.1–0.9)
MONOCYTES NFR BLD AUTO: 7 %
NEUTROPHILS # BLD AUTO: 2.4 X10E3/UL (ref 1.4–7)
NEUTROPHILS NFR BLD AUTO: 43 %
PLATELET # BLD AUTO: 153 X10E3/UL (ref 150–450)
POTASSIUM SERPL-SCNC: 4.1 MMOL/L (ref 3.5–5.2)
PROT SERPL-MCNC: 7.1 G/DL (ref 6–8.5)
RBC # BLD AUTO: 4.87 X10E6/UL (ref 3.77–5.28)
SODIUM SERPL-SCNC: 143 MMOL/L (ref 134–144)
TRIGL SERPL-MCNC: 99 MG/DL (ref 0–149)
TSH SERPL DL<=0.005 MIU/L-ACNC: 1.36 UIU/ML (ref 0.45–4.5)
VLDLC SERPL CALC-MCNC: 18 MG/DL (ref 5–40)
WBC # BLD AUTO: 5.7 X10E3/UL (ref 3.4–10.8)

## 2022-03-11 NOTE — PROGRESS NOTES
She canceled her appointment she needs follow-up appointment very noncompliant for making appointment she needs follow-up visit to discuss her lab results

## 2022-03-14 ENCOUNTER — TELEPHONE (OUTPATIENT)
Dept: INTERNAL MEDICINE CLINIC | Age: 62
End: 2022-03-14

## 2022-03-14 RX ORDER — ERGOCALCIFEROL 1.25 MG/1
50000 CAPSULE ORAL
Qty: 12 CAPSULE | Refills: 0 | Status: SHIPPED | OUTPATIENT
Start: 2022-03-14 | End: 2022-06-15 | Stop reason: ALTCHOICE

## 2022-03-14 NOTE — PROGRESS NOTES
She has low vitamin D and elevated bad cholesterol she has history of stent in her heart she canceled her appointment now it is so difficult to get in please make appointment to be seen in next few weeks and sent prescription to the pharmacy for vitamin D

## 2022-03-18 PROBLEM — G89.29 CHRONIC PAIN OF BOTH ANKLES: Status: ACTIVE | Noted: 2021-02-09

## 2022-03-18 PROBLEM — I10 ESSENTIAL HYPERTENSION: Status: ACTIVE | Noted: 2020-08-27

## 2022-03-18 PROBLEM — E55.9 VITAMIN D DEFICIENCY: Status: ACTIVE | Noted: 2020-08-27

## 2022-03-18 PROBLEM — M25.571 CHRONIC PAIN OF BOTH ANKLES: Status: ACTIVE | Noted: 2021-02-09

## 2022-03-18 PROBLEM — M25.572 CHRONIC PAIN OF BOTH ANKLES: Status: ACTIVE | Noted: 2021-02-09

## 2022-03-18 PROBLEM — R53.83 FATIGUE: Status: ACTIVE | Noted: 2020-08-27

## 2022-03-18 PROBLEM — E66.01 MORBID OBESITY (HCC): Status: ACTIVE | Noted: 2020-08-27

## 2022-03-19 PROBLEM — Z11.59 NEED FOR HEPATITIS C SCREENING TEST: Status: ACTIVE | Noted: 2022-02-28

## 2022-03-19 PROBLEM — M79.605 PAIN IN BOTH LOWER EXTREMITIES: Status: ACTIVE | Noted: 2020-10-28

## 2022-03-19 PROBLEM — Z95.828 PRESENCE OF STENT IN ARTERY: Status: ACTIVE | Noted: 2020-08-27

## 2022-03-19 PROBLEM — M54.9 BACKACHE: Status: ACTIVE | Noted: 2020-08-27

## 2022-03-19 PROBLEM — E78.00 PURE HYPERCHOLESTEROLEMIA: Status: ACTIVE | Noted: 2020-08-27

## 2022-03-19 PROBLEM — G57.93 NEUROPATHY OF BOTH FEET: Status: ACTIVE | Noted: 2021-12-30

## 2022-03-19 PROBLEM — F41.9 ANXIETY: Status: ACTIVE | Noted: 2020-08-27

## 2022-03-19 PROBLEM — E66.01 SEVERE OBESITY (HCC): Status: ACTIVE | Noted: 2020-10-28

## 2022-03-19 PROBLEM — J30.9 ALLERGIC RHINITIS: Status: ACTIVE | Noted: 2020-08-27

## 2022-03-19 PROBLEM — I25.10 CORONARY ATHEROSCLEROSIS OF NATIVE CORONARY ARTERY: Status: ACTIVE | Noted: 2020-08-27

## 2022-03-19 PROBLEM — M79.89 PAIN AND SWELLING OF LOWER LEG, LEFT: Status: ACTIVE | Noted: 2020-10-28

## 2022-03-19 PROBLEM — R05.9 COUGH: Status: ACTIVE | Noted: 2020-08-27

## 2022-03-19 PROBLEM — M79.604 PAIN IN BOTH LOWER EXTREMITIES: Status: ACTIVE | Noted: 2020-10-28

## 2022-03-19 PROBLEM — I87.2 VENOUS INSUFFICIENCY: Status: ACTIVE | Noted: 2021-02-09

## 2022-03-19 PROBLEM — M79.662 PAIN AND SWELLING OF LOWER LEG, LEFT: Status: ACTIVE | Noted: 2020-10-28

## 2022-03-20 PROBLEM — K21.9 GERD (GASTROESOPHAGEAL REFLUX DISEASE): Status: ACTIVE | Noted: 2020-08-27

## 2022-03-30 PROBLEM — Z11.59 NEED FOR HEPATITIS C SCREENING TEST: Status: RESOLVED | Noted: 2022-02-28 | Resolved: 2022-03-30

## 2022-05-21 ENCOUNTER — APPOINTMENT (OUTPATIENT)
Dept: GENERAL RADIOLOGY | Age: 62
End: 2022-05-21
Attending: EMERGENCY MEDICINE
Payer: COMMERCIAL

## 2022-05-21 ENCOUNTER — HOSPITAL ENCOUNTER (EMERGENCY)
Age: 62
Discharge: HOME | End: 2022-05-21
Attending: EMERGENCY MEDICINE
Payer: COMMERCIAL

## 2022-05-21 VITALS
HEART RATE: 95 BPM | RESPIRATION RATE: 16 BRPM | HEIGHT: 67 IN | SYSTOLIC BLOOD PRESSURE: 151 MMHG | OXYGEN SATURATION: 98 % | DIASTOLIC BLOOD PRESSURE: 95 MMHG | BODY MASS INDEX: 33.59 KG/M2 | TEMPERATURE: 98.9 F | WEIGHT: 214 LBS

## 2022-05-21 DIAGNOSIS — M19.90 ARTHRITIS: Primary | ICD-10-CM

## 2022-05-21 PROCEDURE — 74011250636 HC RX REV CODE- 250/636: Performed by: EMERGENCY MEDICINE

## 2022-05-21 PROCEDURE — 99284 EMERGENCY DEPT VISIT MOD MDM: CPT

## 2022-05-21 PROCEDURE — 73130 X-RAY EXAM OF HAND: CPT

## 2022-05-21 PROCEDURE — 74011250637 HC RX REV CODE- 250/637: Performed by: EMERGENCY MEDICINE

## 2022-05-21 PROCEDURE — 96372 THER/PROPH/DIAG INJ SC/IM: CPT

## 2022-05-21 RX ORDER — ONDANSETRON 4 MG/1
4 TABLET, ORALLY DISINTEGRATING ORAL
Status: COMPLETED | OUTPATIENT
Start: 2022-05-21 | End: 2022-05-21

## 2022-05-21 RX ORDER — IBUPROFEN 600 MG/1
600 TABLET ORAL ONCE
Status: COMPLETED | OUTPATIENT
Start: 2022-05-21 | End: 2022-05-21

## 2022-05-21 RX ADMIN — METHYLPREDNISOLONE SODIUM SUCCINATE 125 MG: 125 INJECTION, POWDER, FOR SOLUTION INTRAMUSCULAR; INTRAVENOUS at 19:53

## 2022-05-21 RX ADMIN — ONDANSETRON 4 MG: 4 TABLET, ORALLY DISINTEGRATING ORAL at 20:09

## 2022-05-21 RX ADMIN — IBUPROFEN 600 MG: 600 TABLET, FILM COATED ORAL at 19:53

## 2022-05-21 NOTE — ED TRIAGE NOTES
Rt hand pain/stiffness, no hx of arthritis, says was fine, took a nap and awakened with pain/stiffness

## 2022-05-22 NOTE — ED PROVIDER NOTES
EMERGENCY DEPARTMENT HISTORY AND PHYSICAL EXAM      Date: 5/21/2022  Patient Name: Quan Aguirre    History of Presenting Illness     Chief Complaint   Patient presents with    Hand Pain       History Provided By: Patient    HPI: Quan Aguirre, 63 y.o. female with a past medical history significant hypertension, hyperlipidemia and CAD presents to the ED with cc of right hand pain and stiffness with difficulty holding objects. Patient reports no inciting injury. She states that she awoke from sleep and had symptoms. She has never had these symptoms previously. Patient has not had anything for her symptoms at home prior to arrival to the emergency department. She reports no paresthesias or skin color changes. There are no other complaints, changes, or physical findings at this time. PCP: Giovanna Holt MD    No current facility-administered medications on file prior to encounter. Current Outpatient Medications on File Prior to Encounter   Medication Sig Dispense Refill    ergocalciferol (ERGOCALCIFEROL) 1,250 mcg (50,000 unit) capsule Take 1 Capsule by mouth every seven (7) days. 12 Capsule 0    diclofenac (VOLTAREN) 1 % gel Apply  to affected area four (4) times daily. 20 g 0    atorvastatin (LIPITOR) 40 mg tablet Take 1 Tablet by mouth daily. 90 Tablet 0    losartan-hydroCHLOROthiazide (HYZAAR) 50-12.5 mg per tablet Take 1 Tablet by mouth Every morning. 90 Tablet 0    omeprazole (PRILOSEC) 40 mg capsule Take 1 Capsule by mouth daily. Take 30 minutes before dinner once a day. 90 Capsule 0    ADULT LOW DOSE ASPIRIN PO Take 1 Tab by mouth daily.  folic acid/multivit-min/lutein (CENTRUM SILVER PO) Take 1 Tab by mouth daily.          Past History     Past Medical History:  Past Medical History:   Diagnosis Date    CAD (coronary artery disease)     Hypercholesterolemia     Hypertension        Past Surgical History:  Past Surgical History:   Procedure Laterality Date    HX COLONOSCOPY 08/28/2017    HX CORONARY STENT PLACEMENT      HX PARTIAL HYSTERECTOMY         Family History:  Family History   Problem Relation Age of Onset    OSTEOARTHRITIS Mother     Diabetes Father        Social History:  Social History     Tobacco Use    Smoking status: Current Some Day Smoker     Packs/day: 0.25     Years: 30.00     Pack years: 7.50     Last attempt to quit: 2019     Years since quitting: 3.3    Smokeless tobacco: Never Used   Substance Use Topics    Alcohol use: Yes     Comment: occ    Drug use: Not on file       Allergies:  No Known Allergies      Review of Systems     Review of Systems   Constitutional: Negative for chills and fever. HENT: Negative for congestion and sore throat. Eyes: Negative for pain and visual disturbance. Respiratory: Negative for cough and shortness of breath. Cardiovascular: Negative for chest pain and palpitations. Gastrointestinal: Negative for constipation, diarrhea, nausea and vomiting. Genitourinary: Negative for dysuria and frequency. Musculoskeletal: Positive for arthralgias and myalgias. Skin: Negative for color change and rash. Neurological: Negative for dizziness, weakness, light-headedness and headaches. Psychiatric/Behavioral: Negative for dysphoric mood and sleep disturbance. Physical Exam     Physical Exam  Constitutional:       Appearance: Normal appearance. HENT:      Head: Normocephalic and atraumatic. Right Ear: External ear normal.      Left Ear: External ear normal.      Nose: Nose normal.      Mouth/Throat:      Mouth: Mucous membranes are moist.   Eyes:      Extraocular Movements: Extraocular movements intact. Conjunctiva/sclera: Conjunctivae normal.   Cardiovascular:      Rate and Rhythm: Normal rate and regular rhythm. Pulses: Normal pulses. Radial pulses are 2+ on the right side and 2+ on the left side. Heart sounds: Normal heart sounds.    Pulmonary:      Effort: Pulmonary effort is normal.      Breath sounds: Normal breath sounds. Abdominal:      General: Abdomen is flat. There is no distension. Palpations: Abdomen is soft. Tenderness: There is no abdominal tenderness. Musculoskeletal:         General: Tenderness present. Right hand: Tenderness and bony tenderness present. Decreased range of motion. Cervical back: Normal range of motion. Skin:     General: Skin is warm and dry. Capillary Refill: Capillary refill takes less than 2 seconds. Neurological:      General: No focal deficit present. Mental Status: She is alert and oriented to person, place, and time. Mental status is at baseline. Psychiatric:         Mood and Affect: Mood normal.         Behavior: Behavior normal.         Lab and Diagnostic Study Results     Labs -   No results found for this or any previous visit (from the past 12 hour(s)). Radiologic Studies -   @lastxrresult@  CT Results  (Last 48 hours)    None        CXR Results  (Last 48 hours)    None            Medical Decision Making   - I am the first provider for this patient. - I reviewed the vital signs, available nursing notes, past medical history, past surgical history, family history and social history. - Initial assessment performed. The patients presenting problems have been discussed, and they are in agreement with the care plan formulated and outlined with them. I have encouraged them to ask questions as they arise throughout their visit. Vital Signs-Reviewed the patient's vital signs.   Patient Vitals for the past 12 hrs:   Temp Pulse Resp BP SpO2   05/21/22 1840 98.9 °F (37.2 °C) 95 16 (!) 151/95 98 %       Records Reviewed: Nursing Notes    The patient presents with right hand pain with a differential diagnosis of osteoarthritis, gout, occult fracture, neurovascular abnormality, infection      ED Course:          Provider Notes (Medical Decision Making):   53-year-old female presenting to the ED for evaluation of right hand pain x1 day. No inciting injury noted. On physical exam, patient has generalized tenderness of the right hand starting at the level of the wrist and extending distally to the fingertips. X-ray demonstrating osteoarthritis. Patient treated with steroid and nonsteroidal while in the emergency department. Given Zofran after development of nausea and vomiting after administration of Solu-Medrol. Patient provided orthopedic follow-up information at time of discharge. MDM       Procedures   Medical Decision Makingedical Decision Making  Performed by: Dottie Talbert DO  PROCEDURES:  Procedures       Disposition   Disposition: Condition stable  DC- Adult Discharges: All of the diagnostic tests were reviewed and questions answered. Diagnosis, care plan and treatment options were discussed. The patient understands the instructions and will follow up as directed. The patients results have been reviewed with them. They have been counseled regarding their diagnosis. The patient verbally convey understanding and agreement of the signs, symptoms, diagnosis, treatment and prognosis and additionally agrees to follow up as recommended with their PCP in 24 - 48 hours. They also agree with the care-plan and convey that all of their questions have been answered. I have also put together some discharge instructions for them that include: 1) educational information regarding their diagnosis, 2) how to care for their diagnosis at home, as well a 3) list of reasons why they would want to return to the ED prior to their follow-up appointment, should their condition change. DC-The patient was given verbal arthritis instructions    Discharged    DISCHARGE PLAN:  1. Cannot display discharge medications since this patient is not currently admitted.     2.   Follow-up Information     Follow up With Specialties Details Why Contact Info    [facility] Emergency Medicine  As needed, If symptoms worsen [address] [place] [id number] [phone]    Fredy Greenwood MD Orthopedic Surgery, Sports Medicine Physician Schedule an appointment as soon as possible for a visit   [address]  [phone]          3. Return to ED if worse   4. Discharge Medication List as of 5/21/2022  8:05 PM            Diagnosis     Clinical Impression:   1. Arthritis        Attestations:    Kely Sorensen, DO    Please note that this dictation was completed with [employer], the computer voice recognition software. Quite often unanticipated grammatical, syntax, homophones, and other interpretive errors are inadvertently transcribed by the computer software. Please disregard these errors. Please excuse any errors that have escaped final proofreading. Thank you.

## 2022-06-15 ENCOUNTER — OFFICE VISIT (OUTPATIENT)
Dept: INTERNAL MEDICINE CLINIC | Age: 62
End: 2022-06-15
Payer: COMMERCIAL

## 2022-06-15 VITALS
RESPIRATION RATE: 20 BRPM | BODY MASS INDEX: 38.36 KG/M2 | OXYGEN SATURATION: 100 % | HEART RATE: 80 BPM | HEIGHT: 67 IN | WEIGHT: 244.4 LBS | SYSTOLIC BLOOD PRESSURE: 132 MMHG | DIASTOLIC BLOOD PRESSURE: 86 MMHG | TEMPERATURE: 97 F

## 2022-06-15 DIAGNOSIS — E66.01 SEVERE OBESITY (BMI 35.0-39.9) WITH COMORBIDITY (HCC): ICD-10-CM

## 2022-06-15 DIAGNOSIS — Z95.828 PRESENCE OF STENT IN ARTERY: ICD-10-CM

## 2022-06-15 DIAGNOSIS — Z12.11 SCREENING FOR COLON CANCER: ICD-10-CM

## 2022-06-15 DIAGNOSIS — E78.00 PURE HYPERCHOLESTEROLEMIA: ICD-10-CM

## 2022-06-15 DIAGNOSIS — I10 ESSENTIAL HYPERTENSION: ICD-10-CM

## 2022-06-15 DIAGNOSIS — F17.218 CIGARETTE NICOTINE DEPENDENCE WITH OTHER NICOTINE-INDUCED DISORDER: ICD-10-CM

## 2022-06-15 DIAGNOSIS — Z12.31 SCREENING MAMMOGRAM FOR BREAST CANCER: ICD-10-CM

## 2022-06-15 DIAGNOSIS — E55.9 VITAMIN D DEFICIENCY: ICD-10-CM

## 2022-06-15 DIAGNOSIS — K21.9 GASTROESOPHAGEAL REFLUX DISEASE WITHOUT ESOPHAGITIS: ICD-10-CM

## 2022-06-15 DIAGNOSIS — I25.10 ATHEROSCLEROSIS OF NATIVE CORONARY ARTERY OF NATIVE HEART WITHOUT ANGINA PECTORIS: ICD-10-CM

## 2022-06-15 DIAGNOSIS — Z11.59 NEED FOR HEPATITIS C SCREENING TEST: ICD-10-CM

## 2022-06-15 PROCEDURE — 99214 OFFICE O/P EST MOD 30 MIN: CPT | Performed by: INTERNAL MEDICINE

## 2022-06-15 RX ORDER — LOSARTAN POTASSIUM AND HYDROCHLOROTHIAZIDE 12.5; 5 MG/1; MG/1
1 TABLET ORAL EVERY MORNING
Qty: 90 TABLET | Refills: 1 | Status: SHIPPED | OUTPATIENT
Start: 2022-06-15 | End: 2022-08-01 | Stop reason: ALTCHOICE

## 2022-06-15 RX ORDER — ATORVASTATIN CALCIUM 40 MG/1
40 TABLET, FILM COATED ORAL DAILY
Qty: 90 TABLET | Refills: 1 | Status: SHIPPED | OUTPATIENT
Start: 2022-06-15

## 2022-06-15 RX ORDER — OMEPRAZOLE 40 MG/1
CAPSULE, DELAYED RELEASE ORAL
Qty: 90 CAPSULE | Refills: 0 | Status: SHIPPED | OUTPATIENT
Start: 2022-06-15

## 2022-06-15 RX ORDER — IBUPROFEN 200 MG
1 TABLET ORAL EVERY 24 HOURS
Qty: 30 PATCH | Refills: 1 | Status: SHIPPED | OUTPATIENT
Start: 2022-06-15 | End: 2022-07-15

## 2022-06-15 NOTE — PROGRESS NOTES
Rakesh Feliz is a 63 y.o. female and presents with Follow Up Chronic Condition and Hypertension    Ms. Darrell Wright came for follow-up. She has come after a long time. Her blood pressure is controlled. Recently she visited ER for right wrist pain. There was no fracture to date she has normal range of movement. She is due for her colonoscopy that she wants to do colonoscopy rather than Cologuard and she says sometimes she has gas in her stomach so she wants to see gastroenterologist that who can address same issue no nausea no vomiting. No weight loss. No fever or chills. No blood in stool or urine. She is currently smoking 4 to 5 cigarettes/day does not want to try any medicine or patch to quit smoking cigarette. She has scheduled her mammogram next week    She says she does not want to take any vaccines first her priority is to get COVID vaccination. She has no depression. She is due for blood work. She had vitamin D deficiency. I will order baseline vitamin D level before prescribing any high-dose of vitamin D level. Review of Systems    Review of Systems   Constitutional: Negative. Eyes: Negative for blurred vision. Respiratory: Negative for cough, wheezing and stridor. Cardiovascular: Negative. Gastrointestinal: Negative. Genitourinary: Negative. Musculoskeletal: Negative. She had recently visited ER for the wrist pain but x-ray was unremarkable currently no pain and normal range of movement at all joints of both upper extremities   Neurological: Negative for dizziness, tingling, tremors and headaches. Psychiatric/Behavioral: Negative.          Past Medical History:   Diagnosis Date    CAD (coronary artery disease)     Hypercholesterolemia     Hypertension      Past Surgical History:   Procedure Laterality Date    HX COLONOSCOPY  08/28/2017    HX CORONARY STENT PLACEMENT      HX PARTIAL HYSTERECTOMY       Social History     Socioeconomic History    Marital status:    Tobacco Use    Smoking status: Current Some Day Smoker     Packs/day: 0.25     Years: 30.00     Pack years: 7.50     Last attempt to quit: 2019     Years since quitting: 3.4    Smokeless tobacco: Never Used   Vaping Use    Vaping Use: Never used   Substance and Sexual Activity    Alcohol use: Yes     Comment: occ     Family History   Problem Relation Age of Onset    OSTEOARTHRITIS Mother     Alzheimer's Disease Mother     Thyroid Disease Mother     Hypertension Mother     Diabetes Father     Thyroid Disease Sister     Hypertension Sister      Current Outpatient Medications   Medication Sig Dispense Refill    atorvastatin (LIPITOR) 40 mg tablet Take 1 Tablet by mouth daily. 90 Tablet 1    losartan-hydroCHLOROthiazide (HYZAAR) 50-12.5 mg per tablet Take 1 Tablet by mouth Every morning. 90 Tablet 1    omeprazole (PRILOSEC) 40 mg capsule Take 30 minutes before dinner once a day. as needed 90 Capsule 0    nicotine (NICODERM CQ) 14 mg/24 hr patch 1 Patch by TransDERmal route every twenty-four (24) hours for 30 days. 30 Patch 1    ADULT LOW DOSE ASPIRIN PO Take 1 Tab by mouth daily. No Known Allergies    Objective:  Visit Vitals  /86 (BP 1 Location: Left upper arm, BP Patient Position: Sitting, BP Cuff Size: Adult)   Pulse 80   Temp 97 °F (36.1 °C) (Temporal)   Resp 20   Ht 5' 7\" (1.702 m)   Wt 244 lb 6.4 oz (110.9 kg)   SpO2 100%   BMI 38.28 kg/m²       Physical Exam:   Constitutional: General Appearance: Pleasant. Level of Distress: NAD. Psychiatric: Mental Status: normal mood and affect Orientation: to time, place, and person. ,normal eye contact. Head: Head: normocephalic and atraumatic. Eyes: Pupils: PERRLA. Sclerae: non-icteric. Neck: Neck: supple, trachea midline, and no masses. Lymph Nodes: no cervical LAD. Thyroid: no enlargement or nodules and non-tender. Lungs: Respiratory effort: no dyspnea.  Auscultation: no wheezing, rales/crackles, or rhonchi and breath sounds normal and good air movement. Cardiovascular: Apical Impulse: not displaced. Heart Auscultation: normal S1 and S2; no murmurs, rubs, or gallops; and RRR. Neck vessels: no carotid bruits. Pulses including femoral / pedal: normal throughout. Abdomen: Bowel Sounds: normal. Inspection and Palpation: no tenderness, guarding, or masses and soft and non-distended. Liver: non-tender and no hepatomegaly. Spleen: non-tender and no splenomegaly. Musculoskeletal[de-identified] Extremities: no edema,no varicosities. No Calf tenderness. Neurologic: Gait and Station: normal gait and station. Motor Strength normal right and left. Skin: Inspection and palpation: no rash, lesions, or ulcer. Bilateral dorsalis pedis palpable in both feet    Results for orders placed or performed during the hospital encounter of 01/32/22   METABOLIC PANEL, BASIC   Result Value Ref Range    Sodium 142 136 - 145 mmol/L    Potassium 4.0 3.5 - 5.1 mmol/L    Chloride 106 97 - 108 mmol/L    CO2 25 21 - 32 mmol/L    Anion gap 11 5 - 15 mmol/L    Glucose 90 65 - 100 mg/dL    BUN 12 6 - 20 mg/dL    Creatinine 0.85 0.55 - 1.02 mg/dL    BUN/Creatinine ratio 14 12 - 20      GFR est AA >60 >60 ml/min/1.73m2    GFR est non-AA >60 >60 ml/min/1.73m2    Calcium 9.4 8.5 - 10.1 mg/dL   CBC WITH AUTOMATED DIFF   Result Value Ref Range    WBC 8.1 3.6 - 11.0 K/uL    RBC 4.76 3.80 - 5.20 M/uL    HGB 13.0 11.5 - 16.0 g/dL    HCT 41.0 35.0 - 47.0 %    MCV 86.1 80.0 - 99.0 FL    MCH 27.3 26.0 - 34.0 PG    MCHC 31.7 30.0 - 36.5 g/dL    RDW 13.0 11.5 - 14.5 %    PLATELET 935 959 - 778 K/uL    MPV 12.0 8.9 - 12.9 FL    NEUTROPHILS 59 32 - 75 %    LYMPHOCYTES 32 12 - 49 %    MONOCYTES 8 5 - 13 %    EOSINOPHILS 1 0 - 7 %    BASOPHILS 0 0 - 1 %    IMMATURE GRANULOCYTES 0 0.0 - 0.5 %    ABS. NEUTROPHILS 4.8 1.8 - 8.0 K/UL    ABS. LYMPHOCYTES 2.6 0.8 - 3.5 K/UL    ABS. MONOCYTES 0.6 0.0 - 1.0 K/UL    ABS. EOSINOPHILS 0.1 0.0 - 0.4 K/UL    ABS. BASOPHILS 0.0 0.0 - 0.1 K/UL    ABS. IMM. GRANS. 0.0 0.00 - 0.04 K/UL    DF AUTOMATED     TROPONIN-HIGH SENSITIVITY   Result Value Ref Range    Troponin-High Sensitivity 6 0 - 51 ng/L   EKG, 12 LEAD, INITIAL   Result Value Ref Range    Ventricular Rate 87 BPM    Atrial Rate 87 BPM    P-R Interval 166 ms    QRS Duration 88 ms    Q-T Interval 370 ms    QTC Calculation (Bezet) 445 ms    Calculated P Axis 65 degrees    Calculated R Axis 9 degrees    Calculated T Axis 63 degrees    Diagnosis       Normal sinus rhythm  Possible Left atrial enlargement  Possible Anterior infarct , age undetermined  Abnormal ECG  When compared with ECG of 24-OCT-2017 16:02,  No significant change was found  Confirmed by Santosh WEBB [address] ([id number]) on 2/14/2022 7:25:37 PM         Assessment/Plan:      ICD-10-CM ICD-9-CM    1. Essential hypertension  I10 401.9 CBC WITH AUTOMATED DIFF      METABOLIC PANEL, COMPREHENSIVE   2. Pure hypercholesterolemia  E78.00 272.0 LIPID PANEL   3. Atherosclerosis of native coronary artery of native heart without angina pectoris  I25.10 414.01    4. Vitamin D deficiency  E55.9 268.9 VITAMIN D, 25 HYDROXY   5. Gastroesophageal reflux disease without esophagitis  K21.9 530.81    6. Severe obesity (BMI 35.0-39. 9) with comorbidity ([facility] [address]  E66.01 278.01    7. Presence of stent in artery  Z95.828 V49.89    8. Screening for colon cancer  Z12.11 V76.51 REFERRAL TO GASTROENTEROLOGY   9. Screening mammogram for breast cancer  Z12.31 V76.12    10. Need for hepatitis C screening test  Z11.59 V73.89 HEPATITIS C AB   11.  Cigarette nicotine dependence with other nicotine-induced disorder  F17.218 292.89      Orders Placed This Encounter    CBC WITH AUTOMATED DIFF    METABOLIC PANEL, COMPREHENSIVE    LIPID PANEL    VITAMIN D, 25 HYDROXY    HEPATITIS C AB    REFERRAL TO GASTROENTEROLOGY     Referral Priority:   Routine     Referral Type:   Consultation     Referral Reason:   Specialty Services Required     Referred to Provider:   Leann Bond MD     Number of Visits Requested:   1    atorvastatin (LIPITOR) 40 mg tablet     Sig: Take 1 Tablet by mouth daily. Dispense:  90 Tablet     Refill:  1    losartan-hydroCHLOROthiazide (HYZAAR) 50-12.5 mg per tablet     Sig: Take 1 Tablet by mouth Every morning. Dispense:  90 Tablet     Refill:  1    omeprazole (PRILOSEC) 40 mg capsule     Sig: Take 30 minutes before dinner once a day. as needed     Dispense:  90 Capsule     Refill:  0    nicotine (NICODERM CQ) 14 mg/24 hr patch     Si Patch by TransDERmal route every twenty-four (24) hours for 30 days. Dispense:  30 Patch     Refill:  1       Hypertension controlled continued losartan HCTZ 50/12.5 mg once a day in the morning diet low in sodium. Hypercholesteremia with CAD s/p and continued on atorvastatin 40 mg at bedtime. She says that she smokes 2 to 4 cigarettes a day counseling given. She wants to try nicotine patch that I prescribed. Charly Lever GERD most likely due to smoking cigarette and she should change her dietary habits to healthy eating habits and continued on omeprazole 40 mg once a day. CAD s/p stent used to see cardiologist in the past.  Continue low-dose aspirin. Continue statin. Heart healthy diet exercise or walking minimum 30 minutes 5 days a week. Vitamin D deficiency labs ordered before deciding the dose of vitamin D. Until that time she can take OTC vitamin D3 1000 units/day. GERD she is  taking omeprazole. Take as needed 30 minutes before eating. Morbid obesity she should try to lose weight with restricted calorie intake and discipline calorie intake and walking. She is already scheduled for mammogram.    Referred her to gastroenterologist for colonoscopy. Going to get COVID-vaccine in the next few days does not want to take any other age-appropriate preventive vaccination. Counseling given. Labs ordered. Refills given. Follow-up in 3 months.       lose weight, increase physical activity, stop smoking (advice and handout given), follow low fat diet, follow low salt diet, routine labs ordered, call if any problems    There are no Patient Instructions on file for this visit. Follow-up and Dispositions    · Return in about 3 months (around 9/15/2022) for follow up for chronic condition.

## 2022-06-15 NOTE — PROGRESS NOTES
1. \"Have you been to the ER, urgent care clinic since your last visit? Hospitalized since your last visit? \" Yes When: May 2022 [facility] Reason for visit: arthritis    2. \"Have you seen or consulted any other health care providers outside of the [address] [place] [address] since your last visit? \" No     3. For patients aged 39-70: Has the patient had a colonoscopy / FIT/ Cologuard? No      If the patient is female:    4. For patients aged 41-77: Has the patient had a mammogram within the past 2 years? Yes - Care Gap present. Rooming MA/LPN to request most recent results coming up 06/17      5. For patients aged 21-65: Has the patient had a pap smear? Yes - Care Gap present.  Rooming MA/LPN to request most recent results coming up 06/17    Visit Vitals  /85 (BP 1 Location: Left arm)   Pulse 86   Temp 97 °F (36.1 °C) (Temporal)   Resp 20   Ht 5' 7\" (1.702 m)   Wt 244 lb 6.4 oz (110.9 kg)   SpO2 100%   BMI 38.28 kg/m²       Chief Complaint   Patient presents with    Follow Up Chronic Condition    Hypertension

## 2022-06-17 ENCOUNTER — OFFICE VISIT (OUTPATIENT)
Dept: OBGYN CLINIC | Age: 62
End: 2022-06-17
Payer: COMMERCIAL

## 2022-06-17 VITALS
HEIGHT: 67 IN | TEMPERATURE: 98.1 F | DIASTOLIC BLOOD PRESSURE: 82 MMHG | SYSTOLIC BLOOD PRESSURE: 135 MMHG | BODY MASS INDEX: 38.66 KG/M2 | WEIGHT: 246.31 LBS | OXYGEN SATURATION: 100 % | HEART RATE: 93 BPM

## 2022-06-17 DIAGNOSIS — Z12.31 ENCOUNTER FOR SCREENING MAMMOGRAM FOR MALIGNANT NEOPLASM OF BREAST: ICD-10-CM

## 2022-06-17 DIAGNOSIS — Z90.710 H/O TOTAL VAGINAL HYSTERECTOMY: ICD-10-CM

## 2022-06-17 DIAGNOSIS — Z01.419 ROUTINE GYNECOLOGICAL EXAMINATION: Primary | ICD-10-CM

## 2022-06-17 PROCEDURE — 99396 PREV VISIT EST AGE 40-64: CPT | Performed by: OBSTETRICS & GYNECOLOGY

## 2022-06-17 NOTE — PROGRESS NOTES
Fish Guillaume is a 63 y.o. female who presents today for the following:  Chief Complaint   Patient presents with    Annual Exam        No Known Allergies    Current Outpatient Medications   Medication Sig    atorvastatin (LIPITOR) 40 mg tablet Take 1 Tablet by mouth daily.  losartan-hydroCHLOROthiazide (HYZAAR) 50-12.5 mg per tablet Take 1 Tablet by mouth Every morning.  omeprazole (PRILOSEC) 40 mg capsule Take 30 minutes before dinner once a day. as needed    ADULT LOW DOSE ASPIRIN PO Take 1 Tab by mouth daily.  nicotine (NICODERM CQ) 14 mg/24 hr patch 1 Patch by TransDERmal route every twenty-four (24) hours for 30 days. (Patient not taking: Reported on 6/17/2022)     No current facility-administered medications for this visit.        Past Medical History:   Diagnosis Date    CAD (coronary artery disease)     Hypercholesterolemia     Hypertension        Past Surgical History:   Procedure Laterality Date    HX COLONOSCOPY  08/28/2017    HX CORONARY STENT PLACEMENT      HX TOTAL VAGINAL HYSTERECTOMY         Family History   Problem Relation Age of Onset    OSTEOARTHRITIS Mother     Alzheimer's Disease Mother     Thyroid Disease Mother     Hypertension Mother     Diabetes Father     Thyroid Disease Sister     Hypertension Sister        Social History     Socioeconomic History    Marital status:      Spouse name: Not on file    Number of children: Not on file    Years of education: Not on file    Highest education level: Not on file   Occupational History    Not on file   Tobacco Use    Smoking status: Current Some Day Smoker     Packs/day: 0.25     Years: 30.00     Pack years: 7.50     Last attempt to quit: 2019     Years since quitting: 3.4    Smokeless tobacco: Never Used   Vaping Use    Vaping Use: Never used   Substance and Sexual Activity    Alcohol use: Yes     Comment: occ    Drug use: Not on file    Sexual activity: Not on file   Other Topics Concern    Not on file Social History Narrative    Not on file     Social Determinants of Health     Financial Resource Strain:     Difficulty of Paying Living Expenses: Not on file   Food Insecurity:     Worried About Running Out of Food in the Last Year: Not on file    Hai of Food in the Last Year: Not on file   Transportation Needs:     Lack of Transportation (Medical): Not on file    Lack of Transportation (Non-Medical): Not on file   Physical Activity:     Days of Exercise per Week: Not on file    Minutes of Exercise per Session: Not on file   Stress:     Feeling of Stress : Not on file   Social Connections:     Frequency of Communication with Friends and Family: Not on file    Frequency of Social Gatherings with Friends and Family: Not on file    Attends Jain Services: Not on file    Active Member of [employer] Group or Organizations: Not on file    Attends Club or Organization Meetings: Not on file    Marital Status: Not on file   Intimate Partner Violence:     Fear of Current or Ex-Partner: Not on file    Emotionally Abused: Not on file    Physically Abused: Not on file    Sexually Abused: Not on file   Housing Stability:     Unable to Pay for Housing in the Last Year: Not on file    Number of [place] in the Last Year: Not on file    Unstable Housing in the Last Year: Not on file         HPI  63years old patient presented today for annual exam.  She has history of vaginal hysterectomy procedure. She has no complaints today. ROS   Review of Systems   Constitutional: Negative. HENT: Negative. Eyes: Negative. Respiratory: Negative. Cardiovascular: Negative. Gastrointestinal: Negative. Genitourinary: Negative. Musculoskeletal: Negative. Skin: Negative. Neurological: Negative. Endo/Heme/Allergies: Negative. Psychiatric/Behavioral: Negative.       /82 (BP 1 Location: Left upper arm, BP Patient Position: Sitting, BP Cuff Size: Adult)   Pulse 93   Temp 98.1 °F (36.7 °C) (Temporal)   Ht 5' 7\" (1.702 m)   Wt 246 lb 5 oz (111.7 kg)   SpO2 100%   BMI 38.58 kg/m²    OBGyn Exam   Constitutional  · Appearance: well-nourished, well developed, alert, in no acute distress    HENT  · Head and Face: appears normal    Neck  · Inspection/Palpation: normal appearance, no masses or tenderness  · Lymph Nodes: no lymphadenopathy present  · Thyroid: gland size normal, nontender, no nodules or masses present on palpation    Breasts   Symmetric, no palpable masses, no tenderness, no skin changes, no nipple abnormality, no nipple discharge, no lymphadenopathy.     Chest  · Respiratory Effort: Even and unlabored  · Auscultation: normal breath sounds    Cardiovascular  · Heart:  · Auscultation: regular rate and rhythm without murmur    Gastrointestinal  · Abdominal Examination: abdomen non-tender to palpation, normal bowel sounds, no masses present  · Liver and spleen: no hepatomegaly present, spleen not palpable  · Hernias: no hernias identified    Genitourinary  · External Genitalia: normal appearance for age, no discharge present, no tenderness present, no inflammatory lesions present, no masses present, no atrophy present  · Vagina: normal vaginal vault without central or paravaginal defects, no discharge present, no inflammatory lesions present, no masses present  · Bladder: non-tender to palpation  · Urethra: appears normal  · Cervix: Absent   · Uterus: Absent  · Adnexa: no adnexal tenderness present, no adnexal masses present  · Perineum: perineum within normal limits, no evidence of trauma, no rashes or skin lesions present  · Anus: anus within normal limits, no hemorrhoids present  · Inguinal Lymph Nodes: no lymphadenopathy present    Skin  · General Inspection: no rash, no lesions identified    Neurologic/Psychiatric  · Mental Status:  · Orientation: grossly oriented to person, place and time  · Mood and Affect: mood normal, affect appropriate    No results found for this visit on 06/17/22. Orders Placed This Encounter    [facility] 3D MARCELO W MAMMO BI SCREENING INCL CAD     Standing Status:   Future     Standing Expiration Date:   6/17/2023     Order Specific Question:   Reason for Exam     Answer:   Screening         1. Routine gynecological examination  Discussed importance of getting annual exams. Educated on breast self awareness. Encouraged healthy lifestyle (educated on the importance of healthy weight management and the significance of not smoking). Discussed the risk of osteoporosis and recommended calcium as well as vitamin D supplementation daily. Reviewed cervical cancer screening, mammogram, bone density, colonoscopy testing guidelines. 2. Encounter for screening mammogram for malignant neoplasm of breast    - [facility] 3D MARCELO W MAMMO BI SCREENING INCL CAD; Future    3.  H/O total vaginal hysterectomy          Follow-up and Dispositions    · Return in about 1 year (around 6/17/2023) for Annual Exam.

## 2022-07-15 PROBLEM — Z12.31 SCREENING MAMMOGRAM FOR BREAST CANCER: Status: RESOLVED | Noted: 2022-06-15 | Resolved: 2022-07-15

## 2022-07-26 ENCOUNTER — HOSPITAL ENCOUNTER (OUTPATIENT)
Dept: MAMMOGRAPHY | Age: 62
Discharge: HOME | End: 2022-07-26
Attending: OBSTETRICS & GYNECOLOGY
Payer: COMMERCIAL

## 2022-07-26 DIAGNOSIS — Z12.31 ENCOUNTER FOR SCREENING MAMMOGRAM FOR MALIGNANT NEOPLASM OF BREAST: ICD-10-CM

## 2022-07-26 PROCEDURE — 77063 BREAST TOMOSYNTHESIS BI: CPT

## 2022-08-01 ENCOUNTER — TELEPHONE (OUTPATIENT)
Dept: INTERNAL MEDICINE CLINIC | Age: 62
End: 2022-08-01

## 2022-08-01 RX ORDER — HYDROCHLOROTHIAZIDE 12.5 MG/1
12.5 TABLET ORAL EVERY MORNING
Qty: 90 TABLET | Refills: 0 | Status: SHIPPED | OUTPATIENT
Start: 2022-08-01

## 2022-08-01 RX ORDER — LOSARTAN POTASSIUM 50 MG/1
50 TABLET ORAL EVERY MORNING
Qty: 90 TABLET | Refills: 0 | Status: SHIPPED | OUTPATIENT
Start: 2022-08-01

## 2022-08-01 RX ORDER — LISINOPRIL 40 MG/1
40 TABLET ORAL DAILY
Qty: 90 TABLET | Refills: 0 | Status: CANCELLED | OUTPATIENT
Start: 2022-08-01

## 2022-08-01 NOTE — TELEPHONE ENCOUNTER
[facility] doesn't have the Lisinopril-HCTZ combined so patient will need them sent separate .       Lisinopirl 50mg one daily    HCTZ 12.5mg one daily

## 2022-09-19 PROBLEM — M25.571 CHRONIC PAIN OF BOTH ANKLES: Status: RESOLVED | Noted: 2021-02-09 | Resolved: 2022-09-19

## 2022-09-19 PROBLEM — M79.662 PAIN AND SWELLING OF LOWER LEG, LEFT: Status: RESOLVED | Noted: 2020-10-28 | Resolved: 2022-09-19

## 2022-09-19 PROBLEM — R05.9 COUGH: Status: RESOLVED | Noted: 2020-08-27 | Resolved: 2022-09-19

## 2022-09-19 PROBLEM — R53.83 FATIGUE: Status: RESOLVED | Noted: 2020-08-27 | Resolved: 2022-09-19

## 2022-09-19 PROBLEM — F41.9 ANXIETY: Status: RESOLVED | Noted: 2020-08-27 | Resolved: 2022-09-19

## 2022-09-19 PROBLEM — M79.605 PAIN IN BOTH LOWER EXTREMITIES: Status: RESOLVED | Noted: 2020-10-28 | Resolved: 2022-09-19

## 2022-09-19 PROBLEM — G89.29 CHRONIC PAIN OF BOTH ANKLES: Status: RESOLVED | Noted: 2021-02-09 | Resolved: 2022-09-19

## 2022-09-19 PROBLEM — M79.604 PAIN IN BOTH LOWER EXTREMITIES: Status: RESOLVED | Noted: 2020-10-28 | Resolved: 2022-09-19

## 2022-09-19 PROBLEM — M54.9 BACKACHE: Status: RESOLVED | Noted: 2020-08-27 | Resolved: 2022-09-19

## 2022-09-19 PROBLEM — M79.89 PAIN AND SWELLING OF LOWER LEG, LEFT: Status: RESOLVED | Noted: 2020-10-28 | Resolved: 2022-09-19

## 2022-09-19 PROBLEM — M25.572 CHRONIC PAIN OF BOTH ANKLES: Status: RESOLVED | Noted: 2021-02-09 | Resolved: 2022-09-19

## 2022-09-25 PROBLEM — Z12.11 SCREENING FOR COLON CANCER: Status: ACTIVE | Noted: 2022-06-15

## 2022-10-25 PROBLEM — Z12.11 SCREENING FOR COLON CANCER: Status: RESOLVED | Noted: 2022-06-15 | Resolved: 2022-10-25

## 2022-11-03 ENCOUNTER — HOSPITAL ENCOUNTER (EMERGENCY)
Age: 62
Discharge: HOME | End: 2022-11-03
Attending: FAMILY MEDICINE
Payer: COMMERCIAL

## 2022-11-03 ENCOUNTER — APPOINTMENT (OUTPATIENT)
Dept: GENERAL RADIOLOGY | Age: 62
End: 2022-11-03
Attending: FAMILY MEDICINE
Payer: COMMERCIAL

## 2022-11-03 VITALS
SYSTOLIC BLOOD PRESSURE: 177 MMHG | TEMPERATURE: 97.9 F | HEIGHT: 67 IN | BODY MASS INDEX: 39.24 KG/M2 | DIASTOLIC BLOOD PRESSURE: 106 MMHG | WEIGHT: 250 LBS | HEART RATE: 96 BPM | OXYGEN SATURATION: 97 % | RESPIRATION RATE: 18 BRPM

## 2022-11-03 DIAGNOSIS — M25.512 ACUTE PAIN OF LEFT SHOULDER: Primary | ICD-10-CM

## 2022-11-03 PROCEDURE — 73030 X-RAY EXAM OF SHOULDER: CPT

## 2022-11-03 PROCEDURE — 99283 EMERGENCY DEPT VISIT LOW MDM: CPT

## 2022-11-03 PROCEDURE — 74011250637 HC RX REV CODE- 250/637: Performed by: FAMILY MEDICINE

## 2022-11-03 RX ORDER — LOSARTAN POTASSIUM 25 MG/1
50 TABLET ORAL DAILY
Status: DISCONTINUED | OUTPATIENT
Start: 2022-11-03 | End: 2022-11-03 | Stop reason: HOSPADM

## 2022-11-03 RX ORDER — DEXTROMETHORPHAN HYDROBROMIDE, GUAIFENESIN 5; 100 MG/5ML; MG/5ML
650 LIQUID ORAL
Qty: 15 TABLET | Refills: 0 | Status: SHIPPED | OUTPATIENT
Start: 2022-11-03 | End: 2022-11-08

## 2022-11-03 RX ORDER — LIDOCAINE 4 G/100G
PATCH TOPICAL
Qty: 5 PATCH | Refills: 0 | Status: SHIPPED | OUTPATIENT
Start: 2022-11-03

## 2022-11-03 RX ORDER — ACETAMINOPHEN 500 MG
1000 TABLET ORAL ONCE
Status: COMPLETED | OUTPATIENT
Start: 2022-11-03 | End: 2022-11-03

## 2022-11-03 RX ADMIN — ACETAMINOPHEN 1000 MG: 500 TABLET ORAL at 09:23

## 2022-11-03 RX ADMIN — LOSARTAN POTASSIUM 50 MG: 25 TABLET, FILM COATED ORAL at 09:23

## 2022-11-03 NOTE — ED PROVIDER NOTES
EMERGENCY DEPARTMENT HISTORY AND PHYSICAL EXAM      Date: 11/3/2022  Patient Name: Cyn Perea    History of Presenting Illness     Chief Complaint   Patient presents with    Shoulder Pain       History Provided By:     HPI: Cyn Perea, is a very pleasant 63 y.o. female presenting to the ED with a chief complaint of left shoulder pain. Patient states he woke up this morning and her left shoulder was achy and painful with movement. She denies any known injury. No numbness nor tingling of this extremity. No redness nor warmth of joint. No fevers chills rigors. Pain is much worse with pressing on this lateral shoulder area. The patient denies any other symptoms at this time. PCP: Diana Cox MD    No current facility-administered medications on file prior to encounter. Current Outpatient Medications on File Prior to Encounter   Medication Sig Dispense Refill    hydroCHLOROthiazide (HYDRODIURIL) 12.5 mg tablet Take 1 Tablet by mouth Every morning. 90 Tablet 0    losartan (COZAAR) 50 mg tablet Take 1 Tablet by mouth Every morning. 90 Tablet 0    atorvastatin (LIPITOR) 40 mg tablet Take 1 Tablet by mouth daily. 90 Tablet 1    omeprazole (PRILOSEC) 40 mg capsule Take 30 minutes before dinner once a day. as needed 90 Capsule 0    ADULT LOW DOSE ASPIRIN PO Take 1 Tab by mouth daily.          Past History     Past Medical History:  Past Medical History:   Diagnosis Date    CAD (coronary artery disease)     Hypercholesterolemia     Hypertension        Past Surgical History:  Past Surgical History:   Procedure Laterality Date    HX COLONOSCOPY  08/28/2017    HX CORONARY STENT PLACEMENT      HX TOTAL VAGINAL HYSTERECTOMY         Family History:  Family History   Problem Relation Age of Onset    OSTEOARTHRITIS Mother     Alzheimer's Disease Mother     Thyroid Disease Mother     Hypertension Mother     Diabetes Father     Thyroid Disease Sister     Hypertension Sister        Social History:  Social History     Tobacco Use    Smoking status: Some Days     Packs/day: 0.25     Years: 30.00     Pack years: 7.50     Types: Cigarettes     Last attempt to quit: 2019     Years since quitting: 3.8    Smokeless tobacco: Never   Vaping Use    Vaping Use: Never used   Substance Use Topics    Alcohol use: Yes     Comment: occ       Allergies:  No Known Allergies      Review of Systems     Review of Systems   Constitutional:  Negative for activity change, appetite change, chills, fatigue and fever. HENT:  Negative for congestion and sore throat. Eyes:  Negative for photophobia and visual disturbance. Respiratory:  Negative for cough, shortness of breath and wheezing. Cardiovascular:  Negative for chest pain, palpitations and leg swelling. Gastrointestinal:  Negative for abdominal pain, diarrhea, nausea and vomiting. Endocrine: Negative for cold intolerance and heat intolerance. Musculoskeletal:  Positive for arthralgias. Negative for gait problem and joint swelling. Skin:  Negative for color change and rash. Neurological:  Negative for dizziness and headaches. Physical Exam     Physical Exam  Constitutional:       Appearance: She is well-developed. HENT:      Head: Normocephalic and atraumatic. Mouth/Throat:      Mouth: Mucous membranes are moist.      Pharynx: Oropharynx is clear. Eyes:      Conjunctiva/sclera: Conjunctivae normal.      Pupils: Pupils are equal, round, and reactive to light. Cardiovascular:      Rate and Rhythm: Normal rate and regular rhythm. Heart sounds: No murmur heard. Pulmonary:      Effort: No respiratory distress. Breath sounds: No stridor. No wheezing, rhonchi or rales. Abdominal:      General: There is no distension. Tenderness: There is no abdominal tenderness. There is no rebound. Musculoskeletal:      Cervical back: Normal range of motion and neck supple. Comments: Left anterior shoulder pain with palpation.   Decreased range of motion left shoulder secondary to pain. Positive rotator cuff testing. No erythema warmth nor swelling of left shoulder   Skin:     General: Skin is warm and dry. Neurological:      General: No focal deficit present. Mental Status: She is alert and oriented to person, place, and time. Psychiatric:         Mood and Affect: Mood normal.         Behavior: Behavior normal.       Lab and Diagnostic Study Results     Labs -   No results found for this or any previous visit (from the past 12 hour(s)). Radiologic Studies -   @lastxrresult@  CT Results  (Last 48 hours)      None          CXR Results  (Last 48 hours)      None              Medical Decision Making   - I am the first provider for this patient. - I reviewed the vital signs, available nursing notes, past medical history, past surgical history, family history and social history. - Initial assessment performed. The patients presenting problems have been discussed, and they are in agreement with the care plan formulated and outlined with them. I have encouraged them to ask questions as they arise throughout their visit. Vital Signs-Reviewed the patient's vital signs. Patient Vitals for the past 12 hrs:   Temp Pulse Resp BP SpO2   11/03/22 0950 -- -- -- (!) 177/106 --   11/03/22 0856 97.9 °F (36.6 °C) 96 18 (!) 195/112 97 %         ED Course/ Provider Notes (Medical Decision Making):     Patient presented to the emergency department with the aforementioned chief complaint. On examination the patient is nontoxic. Vitals were reviewed per above. Neurovascularly intact. No signs of infectious process. X-ray reviewed with patient. Suspect likely rotator cuff pathology. Patient placed in arm sling. Information to follow-up with orthopedics. Melissa Tilley was given a thorough list of signs and symptoms that would warrant an immediate return to the emergency department. Otherwise Melissa Tilley will follow up with PCP.        Procedures   Medical Decision Makingedical Decision Making  Performed by: Ben Calderón DO  Procedures  None       Disposition   Disposition:     Home     All of the diagnostic tests were reviewed and questions answered. Diagnosis, care plan and treatment options were discussed. The patient understands the instructions and will follow up as directed. The patients results have been reviewed with them. They have been counseled regarding their diagnosis. The patient verbally convey understanding and agreement of the signs, symptoms, diagnosis, treatment and prognosis and additionally agrees to follow up as recommended with their PCP in 24 - 48 hours. They also agree with the care-plan and convey that all of their questions have been answered. I have also put together some discharge instructions for them that include: 1) educational information regarding their diagnosis, 2) how to care for their diagnosis at home, as well a 3) list of reasons why they would want to return to the ED prior to their follow-up appointment, should their condition change. DISCHARGE PLAN:    1. Cannot display discharge medications since this patient is not currently admitted. 2.   Follow-up Information       Follow up With Specialties Details Why Contact Info    Your primary care doctor  Schedule an appointment as soon as possible for a visit in 2 days      [facility]  Call   [address] [facility]  [phone]              3.  Return to ED if worse       4. Discharge Medication List as of 11/3/2022  9:45 AM            Diagnosis     Clinical Impression:    1. Acute pain of left shoulder        Attestations:    Ben Calderón DO    Please note that this dictation was completed with [employer], the computer voice recognition software. Quite often unanticipated grammatical, syntax, homophones, and other interpretive errors are inadvertently transcribed by the computer software. Please disregard these errors. Please excuse any errors that have escaped final proofreading. Thank you.

## 2022-11-03 NOTE — ED TRIAGE NOTES
Pt arrives to ED by self. Pt report she woke up at 0200 with L shoulder pain and is having mobility issues with it. Denies CP/SOB. Pt /112 but Pt reports she didn't take her BP meds this morning.

## 2022-11-03 NOTE — DISCHARGE INSTRUCTIONS
Thank you! Thank you for allowing me to care for you in the emergency department. It is my goal to provide you with excellent care. If you have not received excellent quality care, please ask to speak to the nurse manager. Please fill out the survey that will come to you by mail or email since we listen to your feedback! Below you will find a list of your tests from today's visit. Should you have any questions, please do not hesitate to call the emergency department. Labs  No results found for this or any previous visit (from the past 12 hour(s)). Radiologic Studies  XR SHOULDER LT AP/LAT MIN 2 V   Final Result   No acute fracture or dislocation            CT Results  (Last 48 hours)      None          CXR Results  (Last 48 hours)      None          ------------------------------------------------------------------------------------------------------------  The exam and treatment you received in the Emergency Department were for an urgent problem and are not intended as complete care. It is important that you follow-up with a doctor, nurse practitioner, or physician assistant to:  (1) confirm your diagnosis,  (2) re-evaluation of changes in your illness and treatment, and  (3) for ongoing care. Please take your discharge instructions with you when you go to your follow-up appointment. If you have any problem arranging a follow-up appointment, contact the Emergency Department. If your symptoms become worse or you do not improve as expected and you are unable to reach your health care provider, please return to the Emergency Department. We are available 24 hours a day. If a prescription has been provided, please have it filled as soon as possible to prevent a delay in treatment. If you have any questions or reservations about taking the medication due to side effects or interactions with other medications, please call your primary care provider or contact the ER.

## 2022-12-03 ENCOUNTER — APPOINTMENT (OUTPATIENT)
Dept: GENERAL RADIOLOGY | Age: 62
End: 2022-12-03
Attending: FAMILY MEDICINE
Payer: COMMERCIAL

## 2022-12-03 ENCOUNTER — HOSPITAL ENCOUNTER (EMERGENCY)
Age: 62
Discharge: HOME | End: 2022-12-03
Attending: FAMILY MEDICINE
Payer: COMMERCIAL

## 2022-12-03 VITALS
TEMPERATURE: 98.2 F | HEIGHT: 67 IN | RESPIRATION RATE: 18 BRPM | SYSTOLIC BLOOD PRESSURE: 169 MMHG | OXYGEN SATURATION: 99 % | WEIGHT: 190 LBS | DIASTOLIC BLOOD PRESSURE: 93 MMHG | BODY MASS INDEX: 29.82 KG/M2 | HEART RATE: 85 BPM

## 2022-12-03 DIAGNOSIS — M19.011 ARTHRITIS OF RIGHT ACROMIOCLAVICULAR JOINT: Primary | ICD-10-CM

## 2022-12-03 DIAGNOSIS — M25.511 PAIN IN JOINT OF RIGHT SHOULDER: ICD-10-CM

## 2022-12-03 PROCEDURE — 74011000250 HC RX REV CODE- 250: Performed by: FAMILY MEDICINE

## 2022-12-03 PROCEDURE — 96372 THER/PROPH/DIAG INJ SC/IM: CPT

## 2022-12-03 PROCEDURE — 74011250636 HC RX REV CODE- 250/636: Performed by: FAMILY MEDICINE

## 2022-12-03 PROCEDURE — 99284 EMERGENCY DEPT VISIT MOD MDM: CPT

## 2022-12-03 PROCEDURE — 73030 X-RAY EXAM OF SHOULDER: CPT

## 2022-12-03 RX ORDER — ONDANSETRON 4 MG/1
TABLET, ORALLY DISINTEGRATING ORAL
Status: DISCONTINUED
Start: 2022-12-03 | End: 2022-12-04 | Stop reason: HOSPADM

## 2022-12-03 RX ORDER — NAPROXEN 500 MG/1
500 TABLET ORAL
Qty: 10 TABLET | Refills: 0 | Status: SHIPPED | OUTPATIENT
Start: 2022-12-03 | End: 2022-12-08

## 2022-12-03 RX ORDER — ONDANSETRON 2 MG/ML
4 INJECTION INTRAMUSCULAR; INTRAVENOUS
Status: DISCONTINUED | OUTPATIENT
Start: 2022-12-03 | End: 2022-12-03

## 2022-12-03 RX ORDER — LIDOCAINE 4 G/100G
1 PATCH TOPICAL ONCE
Status: DISCONTINUED | OUTPATIENT
Start: 2022-12-03 | End: 2022-12-04 | Stop reason: HOSPADM

## 2022-12-03 RX ORDER — LIDOCAINE 4 G/100G
PATCH TOPICAL
Qty: 5 PATCH | Refills: 0 | Status: SHIPPED | OUTPATIENT
Start: 2022-12-03

## 2022-12-03 RX ORDER — KETOROLAC TROMETHAMINE 30 MG/ML
30 INJECTION, SOLUTION INTRAMUSCULAR; INTRAVENOUS
Status: COMPLETED | OUTPATIENT
Start: 2022-12-03 | End: 2022-12-03

## 2022-12-03 RX ORDER — ONDANSETRON 4 MG/1
4 TABLET, ORALLY DISINTEGRATING ORAL
Status: DISCONTINUED | OUTPATIENT
Start: 2022-12-03 | End: 2022-12-04 | Stop reason: HOSPADM

## 2022-12-03 RX ADMIN — KETOROLAC TROMETHAMINE 30 MG: 30 INJECTION, SOLUTION INTRAMUSCULAR; INTRAVENOUS at 21:18

## 2022-12-04 NOTE — DISCHARGE INSTRUCTIONS
Thank you! Thank you for allowing me to care for you in the emergency department. It is my goal to provide you with excellent care. If you have not received excellent quality care, please ask to speak to the nurse manager. Please fill out the survey that will come to you by mail or email since we listen to your feedback! Below you will find a list of your tests from today's visit. Should you have any questions, please do not hesitate to call the emergency department. Labs  No results found for this or any previous visit (from the past 12 hour(s)). Radiologic Studies  XR SHOULDER RT AP/LAT MIN 2 V   Final Result   No acute abnormality. CT Results  (Last 48 hours)      None          CXR Results  (Last 48 hours)      None          ------------------------------------------------------------------------------------------------------------  The exam and treatment you received in the Emergency Department were for an urgent problem and are not intended as complete care. It is important that you follow-up with a doctor, nurse practitioner, or physician assistant to:  (1) confirm your diagnosis,  (2) re-evaluation of changes in your illness and treatment, and  (3) for ongoing care. Please take your discharge instructions with you when you go to your follow-up appointment. If you have any problem arranging a follow-up appointment, contact the Emergency Department. If your symptoms become worse or you do not improve as expected and you are unable to reach your health care provider, please return to the Emergency Department. We are available 24 hours a day. If a prescription has been provided, please have it filled as soon as possible to prevent a delay in treatment. If you have any questions or reservations about taking the medication due to side effects or interactions with other medications, please call your primary care provider or contact the ER.

## 2022-12-06 NOTE — ED PROVIDER NOTES
EMERGENCY DEPARTMENT HISTORY AND PHYSICAL EXAM      Date: 12/3/2022  Patient Name: Suhas Busch    History of Presenting Illness     Chief Complaint   Patient presents with    Arm Pain       History Provided By:     HPI: Suhas Busch, is a very pleasant 63 y.o. female presenting to the ED with a chief complaint of arm pain. Patient states her symptoms started this morning. She denies any known injury. Pain is localized in the right shoulder and upper arm. Pain worse with movement. No redness, swelling. No fevers. No known injury. Pain is reproducible with movement. The patient denies any other symptoms at this time. PCP: Gayatri Murrieta MD    No current facility-administered medications on file prior to encounter. Current Outpatient Medications on File Prior to Encounter   Medication Sig Dispense Refill    hydroCHLOROthiazide (HYDRODIURIL) 12.5 mg tablet Take 1 Tablet by mouth Every morning. 90 Tablet 0    losartan (COZAAR) 50 mg tablet Take 1 Tablet by mouth Every morning. 90 Tablet 0    atorvastatin (LIPITOR) 40 mg tablet Take 1 Tablet by mouth daily. 90 Tablet 1    omeprazole (PRILOSEC) 40 mg capsule Take 30 minutes before dinner once a day. as needed 90 Capsule 0    ADULT LOW DOSE ASPIRIN PO Take 1 Tab by mouth daily.          Past History     Past Medical History:  Past Medical History:   Diagnosis Date    CAD (coronary artery disease)     Hypercholesterolemia     Hypertension        Past Surgical History:  Past Surgical History:   Procedure Laterality Date    HX COLONOSCOPY  08/28/2017    HX CORONARY STENT PLACEMENT      HX TOTAL VAGINAL HYSTERECTOMY         Family History:  Family History   Problem Relation Age of Onset    OSTEOARTHRITIS Mother     Alzheimer's Disease Mother     Thyroid Disease Mother     Hypertension Mother     Diabetes Father     Thyroid Disease Sister     Hypertension Sister        Social History:  Social History     Tobacco Use    Smoking status: Some Days Packs/day: 0.25     Years: 30.00     Pack years: 7.50     Types: Cigarettes     Last attempt to quit: 2019     Years since quitting: 3.9    Smokeless tobacco: Never   Vaping Use    Vaping Use: Never used   Substance Use Topics    Alcohol use: Yes     Comment: occ       Allergies:  No Known Allergies      Review of Systems     Review of Systems   Constitutional:  Negative for activity change, appetite change, chills, fatigue and fever. HENT:  Negative for congestion and sore throat. Eyes:  Negative for photophobia and visual disturbance. Respiratory:  Negative for cough, shortness of breath and wheezing. Cardiovascular:  Negative for chest pain, palpitations and leg swelling. Gastrointestinal:  Negative for abdominal pain, diarrhea, nausea and vomiting. Endocrine: Negative for cold intolerance and heat intolerance. Musculoskeletal:  Positive for arthralgias. Negative for gait problem and joint swelling. Skin:  Negative for color change and rash. Neurological:  Negative for dizziness and headaches. Physical Exam     Physical Exam  Constitutional:       Appearance: She is well-developed. HENT:      Head: Normocephalic and atraumatic. Mouth/Throat:      Mouth: Mucous membranes are moist.      Pharynx: Oropharynx is clear. Eyes:      Conjunctiva/sclera: Conjunctivae normal.      Pupils: Pupils are equal, round, and reactive to light. Cardiovascular:      Rate and Rhythm: Normal rate and regular rhythm. Heart sounds: No murmur heard. Pulmonary:      Effort: No respiratory distress. Breath sounds: No stridor. No wheezing, rhonchi or rales. Abdominal:      General: There is no distension. Tenderness: There is no abdominal tenderness. There is no rebound. Musculoskeletal:      Cervical back: Normal range of motion and neck supple. Comments: Tenderness to palpation over the right AC joint. No motor nor sensory deficits. Skin:     General: Skin is warm and dry. Neurological:      General: No focal deficit present. Mental Status: She is alert and oriented to person, place, and time. Psychiatric:         Mood and Affect: Mood normal.         Behavior: Behavior normal.       Lab and Diagnostic Study Results     Labs -   No results found for this or any previous visit (from the past 12 hour(s)). Radiologic Studies -   @lastxrresult@  CT Results  (Last 48 hours)      None          CXR Results  (Last 48 hours)      None              Medical Decision Making   - I am the first provider for this patient. - I reviewed the vital signs, available nursing notes, past medical history, past surgical history, family history and social history. - Initial assessment performed. The patients presenting problems have been discussed, and they are in agreement with the care plan formulated and outlined with them. I have encouraged them to ask questions as they arise throughout their visit. Vital Signs-Reviewed the patient's vital signs. No data found. ED Course/ Provider Notes (Medical Decision Making):     Patient presented to the emergency department with the aforementioned chief complaint. On examination the patient is nontoxic. Vitals were reviewed per above. X-ray reviewed with patient. Suspect her symptoms are related to her [facility] joint arthritis. Patient to follow-up with orthopedics. No evidence of infectious process. Morris Boyd was given a thorough list of signs and symptoms that would warrant an immediate return to the emergency department. Otherwise Morris Boyd will follow up with PCP. Procedures   Medical Decision Makingedical Decision Making  Performed by: Shara Monroy DO  Procedures  None       Disposition   Disposition:     Home     All of the diagnostic tests were reviewed and questions answered. Diagnosis, care plan and treatment options were discussed. The patient understands the instructions and will follow up as directed.  The patients results have been reviewed with them. They have been counseled regarding their diagnosis. The patient verbally convey understanding and agreement of the signs, symptoms, diagnosis, treatment and prognosis and additionally agrees to follow up as recommended with their PCP in 24 - 48 hours. They also agree with the care-plan and convey that all of their questions have been answered. I have also put together some discharge instructions for them that include: 1) educational information regarding their diagnosis, 2) how to care for their diagnosis at home, as well a 3) list of reasons why they would want to return to the ED prior to their follow-up appointment, should their condition change. DISCHARGE PLAN:    1. Cannot display discharge medications since this patient is not currently admitted. 2.   Follow-up Information       Follow up With Specialties Details Why Contact Info    Your primary care doctor  Schedule an appointment as soon as possible for a visit in 2 days      [facility]  Call   [address]  [phone]              3.  Return to ED if worse       4. Discharge Medication List as of 12/3/2022  9:51 PM        START taking these medications    Details   naproxen (NAPROSYN) 500 mg tablet Take 1 Tablet by mouth two (2) times daily as needed for Pain for up to 5 days. , Normal, Disp-10 Tablet, R-0           CONTINUE these medications which have CHANGED    Details   lidocaine 4 % patch Apply to area of maximal pain daily not to exceed 12 hours/day, Normal, Disp-5 Patch, R-0           CONTINUE these medications which have NOT CHANGED    Details   hydroCHLOROthiazide (HYDRODIURIL) 12.5 mg tablet Take 1 Tablet by mouth Every morning., Normal, Disp-90 Tablet, R-0      losartan (COZAAR) 50 mg tablet Take 1 Tablet by mouth Every morning., Normal, Disp-90 Tablet, R-0      atorvastatin (LIPITOR) 40 mg tablet Take 1 Tablet by mouth daily. , Normal, Disp-90 Tablet, R-1      omeprazole (PRILOSEC) 40 mg capsule Take 30 minutes before dinner once a day. as needed, Normal, Disp-90 Capsule, R-0      ADULT LOW DOSE ASPIRIN PO Take 1 Tab by mouth daily. , Historical Med               Diagnosis     Clinical Impression:    1. Arthritis of right acromioclavicular joint    2. Pain in joint of right shoulder        Attestations:    Ben Calderón, DO    Please note that this dictation was completed with [employer], the [employer] voice recognition software. Quite often unanticipated grammatical, syntax, homophones, and other interpretive errors are inadvertently transcribed by the computer software. Please disregard these errors. Please excuse any errors that have escaped final proofreading. Thank you.

## 2023-04-04 ENCOUNTER — HOSPITAL ENCOUNTER (OUTPATIENT)
Age: 63
End: 2023-04-04
Attending: EMERGENCY MEDICINE
Payer: COMMERCIAL

## 2023-04-04 PROCEDURE — 74011250637 HC RX REV CODE- 250/637: Performed by: EMERGENCY MEDICINE

## 2023-04-04 PROCEDURE — 99283 EMERGENCY DEPT VISIT LOW MDM: CPT

## 2023-04-04 PROCEDURE — 74011636637 HC RX REV CODE- 636/637: Performed by: EMERGENCY MEDICINE

## 2023-04-04 RX ORDER — PREDNISONE 20 MG/1
60 TABLET ORAL ONCE
Status: COMPLETED
Start: 2023-04-04 | End: 2023-04-04

## 2023-04-04 RX ORDER — CLONIDINE HYDROCHLORIDE 0.1 MG/1
0.2 TABLET ORAL
Status: COMPLETED
Start: 2023-04-04 | End: 2023-04-04

## 2023-04-04 RX ADMIN — CLONIDINE HYDROCHLORIDE 0.2 MG: 0.1 TABLET ORAL at 22:54

## 2023-04-04 RX ADMIN — PREDNISONE 60 MG: 20 TABLET ORAL at 22:37

## 2023-04-04 NOTE — Clinical Note
[address]  [address] [facility] [address]  [phone]    Work/School Note    Date: 4/4/2023    To Whom It May concern:    Tammy Drew was seen and treated today in the emergency room by the following provider(s):  Attending Provider: Kaci Lan MD.      Tammy Drew is excused from work/school on 4/4/2023 through 4/6/2023. She is medically clear to return to work/school on 4/7/2023.          Sincerely,          Saba Avery MD

## 2023-04-05 NOTE — ED PROVIDER NOTES
EMERGENCY DEPARTMENT HISTORY AND PHYSICAL EXAM      Date: 4/4/2023  Patient Name: Vanessa Garcia    History of Presenting Illness     Chief Complaint   Patient presents with    Hand Swelling       History Provided By: Patient    HPI: Vanessa Garcia, 63 y.o. female   presents to the ED with cc of hand swelling. Patient complains of bilateral hand swelling that she noticed shortly after taking a nap this afternoon approximately an hour prior to arrival.  Right side worse than left. No leg swelling. No shortness of breath or chest pain. Patient states that she was napping the couch and woke up with right-sided arm pain and swelling. Patient has been compliant with her antihypertensive. PCP: Baylee Sutton MD    No current facility-administered medications on file prior to encounter. Current Outpatient Medications on File Prior to Encounter   Medication Sig Dispense Refill    lidocaine 4 % patch Apply to area of maximal pain daily not to exceed 12 hours/day 5 Patch 0    hydroCHLOROthiazide (HYDRODIURIL) 12.5 mg tablet Take 1 Tablet by mouth Every morning. 90 Tablet 0    losartan (COZAAR) 50 mg tablet Take 1 Tablet by mouth Every morning. 90 Tablet 0    atorvastatin (LIPITOR) 40 mg tablet Take 1 Tablet by mouth daily. 90 Tablet 1    omeprazole (PRILOSEC) 40 mg capsule Take 30 minutes before dinner once a day. as needed 90 Capsule 0    ADULT LOW DOSE ASPIRIN PO Take 1 Tab by mouth daily.          Past History     Past Medical History:  Past Medical History:   Diagnosis Date    CAD (coronary artery disease)     Hypercholesterolemia     Hypertension        Past Surgical History:  Past Surgical History:   Procedure Laterality Date    HX COLONOSCOPY  08/28/2017    HX CORONARY STENT PLACEMENT      HX TOTAL VAGINAL HYSTERECTOMY         Family History:  Family History   Problem Relation Age of Onset    OSTEOARTHRITIS Mother     Alzheimer's Disease Mother     Thyroid Disease Mother     Hypertension Mother Diabetes Father     Thyroid Disease Sister     Hypertension Sister        Social History:  Social History     Tobacco Use    Smoking status: Some Days     Packs/day: 0.25     Years: 30.00     Pack years: 7.50     Types: Cigarettes     Last attempt to quit: 2019     Years since quittin.2    Smokeless tobacco: Never   Vaping Use    Vaping Use: Never used   Substance Use Topics    Alcohol use: Yes     Comment: occ    Drug use: Not Currently       Allergies:  No Known Allergies      Review of Systems       Physical Exam   Physical Exam  Vitals and nursing note reviewed. Constitutional:       General: She is not in acute distress. Appearance: Normal appearance. She is not ill-appearing, toxic-appearing or diaphoretic. HENT:      Head: Normocephalic and atraumatic. Mouth/Throat:      Mouth: Mucous membranes are moist.   Eyes:      Conjunctiva/sclera: Conjunctivae normal.   Cardiovascular:      Rate and Rhythm: Normal rate and regular rhythm. Pulmonary:      Effort: Pulmonary effort is normal.      Breath sounds: No wheezing or rales. Musculoskeletal:      Cervical back: Neck supple. Right lower leg: No edema. Left lower leg: No edema. Comments: Bilateral hand mildly swollen right slightly worse than left. No signs of cellulitis without erythema or warmth. No tenderness. No crepitus. Radial pulse palpable. Skin:     General: Skin is warm and dry. Neurological:      General: No focal deficit present. Mental Status: She is alert. Psychiatric:         Behavior: Behavior normal.       Diagnostic Study Results     Labs -   No results found for this or any previous visit (from the past 12 hour(s)). Radiologic Studies -   No orders to display     CT Results  (Last 48 hours)      None          CXR Results  (Last 48 hours)      None              Medical Decision Making   I am the first provider for this patient.     I reviewed the vital signs, available nursing notes, past medical history, past surgical history, family history and social history. Vital Signs-Reviewed the patient's vital signs. Patient Vitals for the past 12 hrs:   Temp Pulse Resp BP SpO2   04/04/23 2334 -- 76 18 (!) 177/99 99 %   04/04/23 2254 -- 81 -- (!) 199/113 --   04/04/23 2234 -- -- -- (!) 186/111 --   04/04/23 2210 (P) 98 °F (36.7 °C) (P) 87 (P) 18 (!) (P) 209/117 (P) 99 %       Records Reviewed:     Provider Notes (Medical Decision Making):   Patient presented with sudden onset of bilateral hand swelling that she woke up from a nap about an hour prior to arrival.  No chest pain or shortness of breath. No pedal swelling. Patient is hypertensive without signs of end-stage damage. No clinical signs of cellulitis or concern for fracture that requires radiographic study. No clinical concern for DVT. No clinical concern for arterial insufficiency. Likely that patient woke up with mild compression of the right arm laying on the couch as a cause of the swelling. Patient was discharged with instruction to elevate his arms until swelling improved. No indication for use of diuretic. Patient was given a dose of p.o. steroid the swelling and also given p.o. clonidine with improvement of blood pressure. ED Course:   Initial assessment performed. The patients presenting problems have been discussed, and they are in agreement with the care plan formulated and outlined with them. I have encouraged them to ask questions as they arise throughout their visit. Blood pressure improved. No chest pain or shortness of breath. PROCEDURES      Disposition: Condition stable   DC- Adult Discharges: All of the diagnostic tests were reviewed and questions answered. Diagnosis, care plan and treatment options were discussed. understand instructions and will follow up as directed. The patients results have been reviewed with them. They have been counseled regarding their diagnosis.   The patient verbally convey understanding and agreement of the signs, symptoms, diagnosis, treatment and prognosis and additionally agrees to follow up as recommended. They also agree with the care-plan and convey that all of their questions have been answered. I have also put together some discharge instructions for them that include: 1) educational information regarding their diagnosis, 2) how to care for their diagnosis at home, as well a 3) list of reasons why they would want to return to the ED prior to their follow-up appointment, should their condition change. PLAN:  1. Current Discharge Medication List        2. Follow-up Information       Follow up With Specialties Details Why Contact Info    Follow up with your primary care physician  Schedule an appointment as soon as possible for a visit in 3 days As needed           Return to ED if worse     Diagnosis     Clinical Impression:   1. Hypertensive urgency    2. Edema of hand        Please note that this dictation was completed with [employer], the computer voice recognition software. Quite often unanticipated grammatical, syntax, homophones, and other interpretive errors are inadvertently transcribed by the computer software. Please disregard these errors. Please excuse any errors that have escaped final proofreading. Thank you.

## 2023-04-06 ENCOUNTER — PATIENT OUTREACH (OUTPATIENT)
Dept: CASE MANAGEMENT | Age: 63
End: 2023-04-06

## 2023-04-06 NOTE — PROGRESS NOTES
04/07/23    AC made outreach attempt, patient was not available to answer the call. VM was full and [facility] was not able to leave a message. [facility] sent [facility] letter via [address] [place] [address] [address]    04/06/23    AC made outreach attempt, patient was not available to answer the call. [facility] left a VM with contact information for a return call.

## 2023-04-19 ENCOUNTER — PATIENT OUTREACH (OUTPATIENT)
Dept: CASE MANAGEMENT | Age: 63
End: 2023-04-19

## 2023-05-25 RX ORDER — ATORVASTATIN CALCIUM 40 MG/1
40 TABLET, FILM COATED ORAL DAILY
COMMUNITY
Start: 2023-04-11 | End: 2023-07-11 | Stop reason: SDUPTHER

## 2023-05-25 RX ORDER — BLOOD PRESSURE TEST KIT
KIT MISCELLANEOUS
COMMUNITY
Start: 2023-04-11

## 2023-05-25 RX ORDER — OMEPRAZOLE 40 MG/1
CAPSULE, DELAYED RELEASE ORAL
COMMUNITY
Start: 2023-04-11 | End: 2023-07-11 | Stop reason: SDUPTHER

## 2023-05-25 RX ORDER — LIDOCAINE 4 G/G
PATCH TOPICAL
COMMUNITY
Start: 2022-12-03

## 2023-05-25 RX ORDER — LOSARTAN POTASSIUM AND HYDROCHLOROTHIAZIDE 25; 100 MG/1; MG/1
1 TABLET ORAL DAILY
COMMUNITY
Start: 2023-04-11 | End: 2023-07-11 | Stop reason: SDUPTHER

## 2023-06-18 ENCOUNTER — APPOINTMENT (OUTPATIENT)
Facility: HOSPITAL | Age: 63
End: 2023-06-18
Payer: COMMERCIAL

## 2023-06-18 ENCOUNTER — HOSPITAL ENCOUNTER (EMERGENCY)
Facility: HOSPITAL | Age: 63
Discharge: HOME | End: 2023-06-18
Payer: COMMERCIAL

## 2023-06-18 VITALS
HEIGHT: 67 IN | DIASTOLIC BLOOD PRESSURE: 84 MMHG | WEIGHT: 249 LBS | BODY MASS INDEX: 39.08 KG/M2 | HEART RATE: 88 BPM | TEMPERATURE: 98.3 F | RESPIRATION RATE: 16 BRPM | SYSTOLIC BLOOD PRESSURE: 164 MMHG | OXYGEN SATURATION: 99 %

## 2023-06-18 DIAGNOSIS — M75.41 SHOULDER IMPINGEMENT SYNDROME, RIGHT: Primary | ICD-10-CM

## 2023-06-18 PROCEDURE — 6370000000 HC RX 637 (ALT 250 FOR IP): Performed by: NURSE PRACTITIONER

## 2023-06-18 PROCEDURE — 99283 EMERGENCY DEPT VISIT LOW MDM: CPT

## 2023-06-18 PROCEDURE — 73030 X-RAY EXAM OF SHOULDER: CPT

## 2023-06-18 RX ORDER — HYDROCODONE BITARTRATE AND ACETAMINOPHEN 5; 325 MG/1; MG/1
1 TABLET ORAL EVERY 6 HOURS PRN
Qty: 12 TABLET | Refills: 0 | Status: SHIPPED | OUTPATIENT
Start: 2023-06-18 | End: 2023-06-21

## 2023-06-18 RX ORDER — PREDNISONE 20 MG/1
60 TABLET ORAL
Status: COMPLETED | OUTPATIENT
Start: 2023-06-18 | End: 2023-06-18

## 2023-06-18 RX ORDER — PREDNISONE 20 MG/1
TABLET ORAL
Qty: 18 TABLET | Refills: 0 | Status: SHIPPED | OUTPATIENT
Start: 2023-06-18 | End: 2023-06-27

## 2023-06-18 RX ADMIN — PREDNISONE 60 MG: 20 TABLET ORAL at 16:33

## 2023-06-18 ASSESSMENT — PAIN - FUNCTIONAL ASSESSMENT: PAIN_FUNCTIONAL_ASSESSMENT: 0-10

## 2023-06-18 ASSESSMENT — PAIN SCALES - GENERAL: PAINLEVEL_OUTOF10: 10

## 2023-07-11 ENCOUNTER — OFFICE VISIT (OUTPATIENT)
Facility: CLINIC | Age: 63
End: 2023-07-11
Payer: COMMERCIAL

## 2023-07-11 VITALS
WEIGHT: 230 LBS | RESPIRATION RATE: 16 BRPM | TEMPERATURE: 98.4 F | HEART RATE: 85 BPM | SYSTOLIC BLOOD PRESSURE: 137 MMHG | OXYGEN SATURATION: 97 % | HEIGHT: 67 IN | BODY MASS INDEX: 36.1 KG/M2 | DIASTOLIC BLOOD PRESSURE: 83 MMHG

## 2023-07-11 DIAGNOSIS — M25.511 CHRONIC RIGHT SHOULDER PAIN: ICD-10-CM

## 2023-07-11 DIAGNOSIS — Z11.4 ENCOUNTER FOR SCREENING FOR HIV: ICD-10-CM

## 2023-07-11 DIAGNOSIS — I10 ESSENTIAL HYPERTENSION: Primary | ICD-10-CM

## 2023-07-11 DIAGNOSIS — M25.531 PAIN IN BOTH WRISTS: ICD-10-CM

## 2023-07-11 DIAGNOSIS — Z11.59 NEED FOR HEPATITIS C SCREENING TEST: ICD-10-CM

## 2023-07-11 DIAGNOSIS — Z23 ENCOUNTER FOR IMMUNIZATION: ICD-10-CM

## 2023-07-11 DIAGNOSIS — G89.29 CHRONIC RIGHT SHOULDER PAIN: ICD-10-CM

## 2023-07-11 DIAGNOSIS — K21.9 GASTROESOPHAGEAL REFLUX DISEASE, UNSPECIFIED WHETHER ESOPHAGITIS PRESENT: ICD-10-CM

## 2023-07-11 DIAGNOSIS — E78.00 PURE HYPERCHOLESTEROLEMIA: ICD-10-CM

## 2023-07-11 DIAGNOSIS — M25.532 PAIN IN BOTH WRISTS: ICD-10-CM

## 2023-07-11 PROCEDURE — 3075F SYST BP GE 130 - 139MM HG: CPT

## 2023-07-11 PROCEDURE — 99214 OFFICE O/P EST MOD 30 MIN: CPT

## 2023-07-11 PROCEDURE — 3079F DIAST BP 80-89 MM HG: CPT

## 2023-07-11 RX ORDER — METHYLPREDNISOLONE 4 MG/1
TABLET ORAL
Qty: 1 KIT | Refills: 0 | Status: SHIPPED | OUTPATIENT
Start: 2023-07-11 | End: 2023-07-17

## 2023-07-11 RX ORDER — ZOSTER VACCINE RECOMBINANT, ADJUVANTED 50 MCG/0.5
0.5 KIT INTRAMUSCULAR SEE ADMIN INSTRUCTIONS
Qty: 0.5 ML | Refills: 0 | Status: SHIPPED | OUTPATIENT
Start: 2023-07-11 | End: 2024-01-07

## 2023-07-11 RX ORDER — LOSARTAN POTASSIUM AND HYDROCHLOROTHIAZIDE 25; 100 MG/1; MG/1
TABLET ORAL
Qty: 90 TABLET | Refills: 0 | OUTPATIENT
Start: 2023-07-11

## 2023-07-11 RX ORDER — ATORVASTATIN CALCIUM 40 MG/1
40 TABLET, FILM COATED ORAL DAILY
Qty: 90 TABLET | Refills: 0 | Status: SHIPPED | OUTPATIENT
Start: 2023-07-11

## 2023-07-11 RX ORDER — PNEUMOCOCCAL VACCINE POLYVALENT 25; 25; 25; 25; 25; 25; 25; 25; 25; 25; 25; 25; 25; 25; 25; 25; 25; 25; 25; 25; 25; 25; 25 UG/.5ML; UG/.5ML; UG/.5ML; UG/.5ML; UG/.5ML; UG/.5ML; UG/.5ML; UG/.5ML; UG/.5ML; UG/.5ML; UG/.5ML; UG/.5ML; UG/.5ML; UG/.5ML; UG/.5ML; UG/.5ML; UG/.5ML; UG/.5ML; UG/.5ML; UG/.5ML; UG/.5ML; UG/.5ML; UG/.5ML
0.5 INJECTION, SOLUTION INTRAMUSCULAR; SUBCUTANEOUS ONCE
Qty: 0.5 ML | Refills: 0 | Status: SHIPPED | OUTPATIENT
Start: 2023-07-11 | End: 2023-07-11

## 2023-07-11 RX ORDER — ATORVASTATIN CALCIUM 40 MG/1
TABLET, FILM COATED ORAL
Qty: 90 TABLET | Refills: 0 | OUTPATIENT
Start: 2023-07-11

## 2023-07-11 RX ORDER — OMEPRAZOLE 40 MG/1
CAPSULE, DELAYED RELEASE ORAL
Qty: 90 CAPSULE | Refills: 0 | OUTPATIENT
Start: 2023-07-11

## 2023-07-11 RX ORDER — OMEPRAZOLE 40 MG/1
CAPSULE, DELAYED RELEASE ORAL
Qty: 90 CAPSULE | Refills: 0 | Status: SHIPPED | OUTPATIENT
Start: 2023-07-11

## 2023-07-11 RX ORDER — LOSARTAN POTASSIUM AND HYDROCHLOROTHIAZIDE 25; 100 MG/1; MG/1
1 TABLET ORAL DAILY
Qty: 90 TABLET | Refills: 0 | Status: SHIPPED | OUTPATIENT
Start: 2023-07-11

## 2023-07-11 SDOH — ECONOMIC STABILITY: FOOD INSECURITY: WITHIN THE PAST 12 MONTHS, YOU WORRIED THAT YOUR FOOD WOULD RUN OUT BEFORE YOU GOT MONEY TO BUY MORE.: NEVER TRUE

## 2023-07-11 SDOH — ECONOMIC STABILITY: INCOME INSECURITY: HOW HARD IS IT FOR YOU TO PAY FOR THE VERY BASICS LIKE FOOD, HOUSING, MEDICAL CARE, AND HEATING?: NOT VERY HARD

## 2023-07-11 SDOH — ECONOMIC STABILITY: HOUSING INSECURITY
IN THE LAST 12 MONTHS, WAS THERE A TIME WHEN YOU DID NOT HAVE A STEADY PLACE TO SLEEP OR SLEPT IN A SHELTER (INCLUDING NOW)?: NO

## 2023-07-11 SDOH — ECONOMIC STABILITY: FOOD INSECURITY: WITHIN THE PAST 12 MONTHS, THE FOOD YOU BOUGHT JUST DIDN'T LAST AND YOU DIDN'T HAVE MONEY TO GET MORE.: NEVER TRUE

## 2023-07-11 ASSESSMENT — ENCOUNTER SYMPTOMS
VOMITING: 0
PHOTOPHOBIA: 0
SHORTNESS OF BREATH: 0
NAUSEA: 0
WHEEZING: 0
CONSTIPATION: 0
COUGH: 0
CHEST TIGHTNESS: 0
DIARRHEA: 0
ABDOMINAL PAIN: 0
TROUBLE SWALLOWING: 0
EYE PAIN: 0
SORE THROAT: 0
BLOOD IN STOOL: 0
BACK PAIN: 0

## 2023-07-11 NOTE — PROGRESS NOTES
Zoran Amin  63 y.o. female  1960  [address]  [id number]     [facility]: Progress Note       Encounter Date: 7/11/2023    Patient presents with the following chief complaint(s)    Chief Complaint   Patient presents with    3 Month Follow-Up    Wrist Pain    Neck Pain        History provided by patient  History of Present Illness   Zoran Amin is a 63 y.o. female with past medical history listed, who presents to clinic today for a follow up visit. Patient has been diagnosed with hypertension. She is on losartan hydrochlorothiazide 100-25. She says that she is working hard to keep her blood pressure within normal range. She is taking her medications daily without problems or complaints. She has history of acid reflux for which she is on omeprazole 40. She is taking her medications daily. She is tolerating medications well without complaints. History of mixed hyperlipidemia for which she is on atorvastatin 40. Patient is tolerating medications well without complaints. She is taking her medications daily. Patient is concerned for pain in wrist for 1 month. She has tingling in her hands. She has tried tylenol arthritis but did not help. Pain 8/10. She works with her hands a lot at work. She also has R shoulder pain x 1 month. She went to ER had imaging done and was given stroids which she says helped her with her discomfort and pain. 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 She has not been to see a specialist as previously referred to from ER discharge paperwork. Patient has not been vaccinated for pneumococcal pneumonia, Tdap, or shingles.     Health Maintenance    Health Maintenance Due   Topic Date Due    Pneumococcal 0-64 years Vaccine (1 - PCV) Never done    HIV screen  Never done    Hepatitis C screen  Never done    DTaP/Tdap/Td vaccine (1 - Tdap) Never done    Colorectal Cancer Screen  Never done

## 2023-07-13 ENCOUNTER — TRANSCRIBE ORDERS (OUTPATIENT)
Facility: HOSPITAL | Age: 63
End: 2023-07-13

## 2023-07-13 DIAGNOSIS — Z12.31 OTHER SCREENING MAMMOGRAM: Primary | ICD-10-CM

## 2023-07-27 ENCOUNTER — HOSPITAL ENCOUNTER (OUTPATIENT)
Facility: HOSPITAL | Age: 63
Discharge: HOME | End: 2023-07-27
Payer: COMMERCIAL

## 2023-07-27 ENCOUNTER — CLINICAL DOCUMENTATION (OUTPATIENT)
Facility: CLINIC | Age: 63
End: 2023-07-27

## 2023-07-27 VITALS — HEIGHT: 67 IN | WEIGHT: 230 LBS | BODY MASS INDEX: 36.1 KG/M2

## 2023-07-27 DIAGNOSIS — Z12.31 OTHER SCREENING MAMMOGRAM: ICD-10-CM

## 2023-07-27 PROCEDURE — 77063 BREAST TOMOSYNTHESIS BI: CPT

## 2023-07-27 NOTE — PROGRESS NOTES
Tried to reach the patient and was not able to leave a message for her in reference to her mammogram the mailbox was full.

## 2023-08-14 ENCOUNTER — HOSPITAL ENCOUNTER (EMERGENCY)
Facility: HOSPITAL | Age: 63
Discharge: HOME | End: 2023-08-14
Attending: STUDENT IN AN ORGANIZED HEALTH CARE EDUCATION/TRAINING PROGRAM
Payer: COMMERCIAL

## 2023-08-14 VITALS
BODY MASS INDEX: 39.24 KG/M2 | HEART RATE: 102 BPM | HEIGHT: 67 IN | TEMPERATURE: 98.7 F | OXYGEN SATURATION: 98 % | SYSTOLIC BLOOD PRESSURE: 138 MMHG | RESPIRATION RATE: 20 BRPM | WEIGHT: 250 LBS | DIASTOLIC BLOOD PRESSURE: 80 MMHG

## 2023-08-14 DIAGNOSIS — M25.511 BILATERAL SHOULDER PAIN, UNSPECIFIED CHRONICITY: Primary | ICD-10-CM

## 2023-08-14 DIAGNOSIS — M25.512 BILATERAL SHOULDER PAIN, UNSPECIFIED CHRONICITY: Primary | ICD-10-CM

## 2023-08-14 DIAGNOSIS — M60.819 MYOSITIS OF SHOULDER, UNSPECIFIED LATERALITY, UNSPECIFIED MYOSITIS TYPE: ICD-10-CM

## 2023-08-14 PROCEDURE — 99284 EMERGENCY DEPT VISIT MOD MDM: CPT

## 2023-08-14 PROCEDURE — 6360000002 HC RX W HCPCS: Performed by: STUDENT IN AN ORGANIZED HEALTH CARE EDUCATION/TRAINING PROGRAM

## 2023-08-14 PROCEDURE — 6370000000 HC RX 637 (ALT 250 FOR IP): Performed by: STUDENT IN AN ORGANIZED HEALTH CARE EDUCATION/TRAINING PROGRAM

## 2023-08-14 RX ORDER — ERGOCALCIFEROL 1.25 MG/1
CAPSULE ORAL
COMMUNITY

## 2023-08-14 RX ORDER — PREDNISONE 20 MG/1
60 TABLET ORAL
Status: COMPLETED | OUTPATIENT
Start: 2023-08-14 | End: 2023-08-14

## 2023-08-14 RX ORDER — KETOROLAC TROMETHAMINE 30 MG/ML
30 INJECTION, SOLUTION INTRAMUSCULAR; INTRAVENOUS ONCE
Status: COMPLETED | OUTPATIENT
Start: 2023-08-14 | End: 2023-08-14

## 2023-08-14 RX ORDER — KETOROLAC TROMETHAMINE 30 MG/ML
30 INJECTION, SOLUTION INTRAMUSCULAR; INTRAVENOUS
Status: DISCONTINUED | OUTPATIENT
Start: 2023-08-14 | End: 2023-08-14

## 2023-08-14 RX ADMIN — KETOROLAC TROMETHAMINE 30 MG: 30 INJECTION, SOLUTION INTRAMUSCULAR; INTRAVENOUS at 17:55

## 2023-08-14 RX ADMIN — PREDNISONE 60 MG: 20 TABLET ORAL at 17:52

## 2023-08-14 ASSESSMENT — PAIN SCALES - GENERAL: PAINLEVEL_OUTOF10: 10

## 2023-08-14 ASSESSMENT — PAIN DESCRIPTION - LOCATION: LOCATION: SHOULDER

## 2023-08-14 ASSESSMENT — PAIN DESCRIPTION - ORIENTATION: ORIENTATION: RIGHT;LEFT

## 2023-08-14 ASSESSMENT — PAIN - FUNCTIONAL ASSESSMENT: PAIN_FUNCTIONAL_ASSESSMENT: 0-10

## 2023-08-14 NOTE — ED PROVIDER NOTES
discharge instructions have been discussed, understanding conveyed, and agreed upon. The patient is to follow up as recommended or return to ER should their symptoms worsen. PATIENT REFERRED TO:  [address] [address] [facility]  [phone]  In 1 week  As needed  for rheumatology evaluation        DISCHARGE MEDICATIONS:     Medication List        CONTINUE taking these medications      Blood Pressure Monitor Kit            ASK your doctor about these medications      ASPIRIN ADULT LOW DOSE PO     atorvastatin 40 MG tablet  Commonly known as: LIPITOR  Take 1 tablet by mouth daily     ergocalciferol 1.25 MG (13620 UT) capsule  Commonly known as: ERGOCALCIFEROL     lidocaine 4 % external patch     losartan-hydroCHLOROthiazide 100-25 MG per tablet  Commonly known as: HYZAAR  Take 1 tablet by mouth daily     omeprazole 40 MG delayed release capsule  Commonly known as: PRILOSEC  Take 30 minutes before dinner once a day. as needed     Shingrix 50 MCG/0.5ML Susr injection  Generic drug: zoster recombinant adjuvanted vaccine  Inject 0.5 mLs into the muscle See Admin Instructions 1 dose now and repeat in 2-6 months                DISCONTINUED MEDICATIONS:  Discharge Medication List as of 8/14/2023  6:37 PM          I am the Primary Clinician of Record: Pinky Argueta MD (electronically signed)    (Please note that parts of this dictation were completed with voice recognition software. Quite often unanticipated grammatical, syntax, homophones, and other interpretive errors are inadvertently transcribed by the computer software. Please disregards these errors.  Please excuse any errors that have escaped final proofreading.)     Andrea Allen MD  08/15/23 3411

## 2023-08-25 ENCOUNTER — HOSPITAL ENCOUNTER (EMERGENCY)
Facility: HOSPITAL | Age: 63
Discharge: HOME | End: 2023-08-25
Attending: EMERGENCY MEDICINE
Payer: COMMERCIAL

## 2023-08-25 ENCOUNTER — APPOINTMENT (OUTPATIENT)
Facility: HOSPITAL | Age: 63
End: 2023-08-25
Payer: COMMERCIAL

## 2023-08-25 VITALS
DIASTOLIC BLOOD PRESSURE: 98 MMHG | SYSTOLIC BLOOD PRESSURE: 163 MMHG | WEIGHT: 234 LBS | HEART RATE: 94 BPM | RESPIRATION RATE: 18 BRPM | OXYGEN SATURATION: 99 % | TEMPERATURE: 98 F | BODY MASS INDEX: 36.73 KG/M2 | HEIGHT: 67 IN

## 2023-08-25 DIAGNOSIS — M25.531 RIGHT WRIST PAIN: Primary | ICD-10-CM

## 2023-08-25 PROCEDURE — 6370000000 HC RX 637 (ALT 250 FOR IP): Performed by: EMERGENCY MEDICINE

## 2023-08-25 PROCEDURE — 99284 EMERGENCY DEPT VISIT MOD MDM: CPT

## 2023-08-25 PROCEDURE — 6360000002 HC RX W HCPCS: Performed by: EMERGENCY MEDICINE

## 2023-08-25 PROCEDURE — 96372 THER/PROPH/DIAG INJ SC/IM: CPT

## 2023-08-25 PROCEDURE — 73110 X-RAY EXAM OF WRIST: CPT

## 2023-08-25 RX ORDER — MELOXICAM 15 MG/1
15 TABLET ORAL DAILY
COMMUNITY
Start: 2023-07-17

## 2023-08-25 RX ORDER — DEXAMETHASONE SODIUM PHOSPHATE 10 MG/ML
10 INJECTION, SOLUTION INTRAMUSCULAR; INTRAVENOUS ONCE
Status: COMPLETED | OUTPATIENT
Start: 2023-08-25 | End: 2023-08-25

## 2023-08-25 RX ORDER — CETIRIZINE HYDROCHLORIDE 10 MG/1
10 TABLET ORAL DAILY
COMMUNITY

## 2023-08-25 RX ORDER — IBUPROFEN 800 MG/1
800 TABLET ORAL
Qty: 20 TABLET | Refills: 0 | Status: SHIPPED | OUTPATIENT
Start: 2023-08-25

## 2023-08-25 RX ORDER — PREDNISONE 20 MG/1
TABLET ORAL
Qty: 12 TABLET | Refills: 1 | Status: SHIPPED | OUTPATIENT
Start: 2023-08-25

## 2023-08-25 RX ORDER — ACETAMINOPHEN 500 MG
1000 TABLET ORAL
Status: COMPLETED | OUTPATIENT
Start: 2023-08-25 | End: 2023-08-25

## 2023-08-25 RX ORDER — KETOROLAC TROMETHAMINE 30 MG/ML
30 INJECTION, SOLUTION INTRAMUSCULAR; INTRAVENOUS
Status: COMPLETED | OUTPATIENT
Start: 2023-08-25 | End: 2023-08-25

## 2023-08-25 RX ADMIN — DEXAMETHASONE SODIUM PHOSPHATE 10 MG: 10 INJECTION INTRAMUSCULAR; INTRAVENOUS at 21:26

## 2023-08-25 RX ADMIN — KETOROLAC TROMETHAMINE 30 MG: 30 INJECTION, SOLUTION INTRAMUSCULAR; INTRAVENOUS at 21:22

## 2023-08-25 RX ADMIN — ACETAMINOPHEN 1000 MG: 500 TABLET ORAL at 21:21

## 2023-08-25 ASSESSMENT — PAIN - FUNCTIONAL ASSESSMENT: PAIN_FUNCTIONAL_ASSESSMENT: 0-10

## 2023-08-25 ASSESSMENT — PAIN SCALES - GENERAL: PAINLEVEL_OUTOF10: 10

## 2023-08-25 ASSESSMENT — LIFESTYLE VARIABLES
HOW OFTEN DO YOU HAVE A DRINK CONTAINING ALCOHOL: NEVER
HOW MANY STANDARD DRINKS CONTAINING ALCOHOL DO YOU HAVE ON A TYPICAL DAY: PATIENT DOES NOT DRINK

## 2023-08-26 NOTE — ED PROVIDER NOTES
EMERGENCY DEPARTMENT HISTORY AND PHYSICAL EXAM    Date: 8/25/2023  Patient Name: Kristyn Castro    History of Presenting Illness     Chief Complaint   Patient presents with    Wrist Pain       History Provided By: Patient    HPI: Kristyn Castro, 63 y.o. female   presents to the ED with cc of right wrist pain. Patient states that she woke up from a nap this afternoon with right wrist pain. Pain has been constant since the onset and moderate degree with any movement aggravate the pain. No alleviating factors. No injury. No other joint pain. No history of arthritis or gout. PCP: Matthew March PA-C    No current facility-administered medications on file prior to encounter. Current Outpatient Medications on File Prior to Encounter   Medication Sig Dispense Refill    cetirizine (ZYRTEC) 10 MG tablet Take 1 tablet by mouth daily      meloxicam (MOBIC) 15 MG tablet Take 1 tablet by mouth daily with food      ASPIRIN ADULT LOW DOSE PO Take 1 tablet by mouth daily      ergocalciferol (ERGOCALCIFEROL) 1.25 MG (66249 UT) capsule  (Patient not taking: Reported on 8/25/2023)      zoster recombinant adjuvanted vaccine [facility]) 50 MCG/0.5ML SUSR injection Inject 0.5 mLs into the muscle See Admin Instructions 1 dose now and repeat in 2-6 months 0.5 mL 0    atorvastatin (LIPITOR) 40 MG tablet Take 1 tablet by mouth daily 90 tablet 0    losartan-hydroCHLOROthiazide (HYZAAR) 100-25 MG per tablet Take 1 tablet by mouth daily 90 tablet 0    omeprazole (PRILOSEC) 40 MG delayed release capsule Take 30 minutes before dinner once a day. as needed 90 capsule 0    Blood Pressure Monitor KIT Use to check blood pressure daily.       lidocaine 4 % external patch Apply to area of maximal pain daily not to exceed 12 hours/day (Patient not taking: Reported on 8/25/2023)         Past History     Past Medical History:  Past Medical History:   Diagnosis Date    CAD (coronary artery disease)     GERD (gastroesophageal reflux disease) note that this dictation was completed with [employer] the [employer] voice recognition software. Quite often unanticipated grammatical, syntax, homophones, and other interpretive errors are inadvertently transcribed by the computer software. Please disregard these errors. Please excuse any errors that have escaped final proofreading. Thank you.        Fuentes Bernabe MD  08/25/23 3676

## 2023-10-01 ENCOUNTER — APPOINTMENT (OUTPATIENT)
Facility: HOSPITAL | Age: 63
End: 2023-10-01
Payer: COMMERCIAL

## 2023-10-01 ENCOUNTER — HOSPITAL ENCOUNTER (EMERGENCY)
Facility: HOSPITAL | Age: 63
Discharge: HOME | End: 2023-10-01
Attending: STUDENT IN AN ORGANIZED HEALTH CARE EDUCATION/TRAINING PROGRAM
Payer: COMMERCIAL

## 2023-10-01 VITALS
TEMPERATURE: 98.2 F | DIASTOLIC BLOOD PRESSURE: 91 MMHG | BODY MASS INDEX: 36.73 KG/M2 | SYSTOLIC BLOOD PRESSURE: 164 MMHG | RESPIRATION RATE: 18 BRPM | HEART RATE: 88 BPM | HEIGHT: 67 IN | OXYGEN SATURATION: 96 % | WEIGHT: 234 LBS

## 2023-10-01 DIAGNOSIS — M79.641 RIGHT HAND PAIN: Primary | ICD-10-CM

## 2023-10-01 PROCEDURE — 73110 X-RAY EXAM OF WRIST: CPT

## 2023-10-01 PROCEDURE — 6370000000 HC RX 637 (ALT 250 FOR IP): Performed by: STUDENT IN AN ORGANIZED HEALTH CARE EDUCATION/TRAINING PROGRAM

## 2023-10-01 PROCEDURE — 96372 THER/PROPH/DIAG INJ SC/IM: CPT

## 2023-10-01 PROCEDURE — 73130 X-RAY EXAM OF HAND: CPT

## 2023-10-01 PROCEDURE — 99284 EMERGENCY DEPT VISIT MOD MDM: CPT

## 2023-10-01 PROCEDURE — 6360000002 HC RX W HCPCS: Performed by: STUDENT IN AN ORGANIZED HEALTH CARE EDUCATION/TRAINING PROGRAM

## 2023-10-01 RX ORDER — KETOROLAC TROMETHAMINE 30 MG/ML
30 INJECTION, SOLUTION INTRAMUSCULAR; INTRAVENOUS
Status: COMPLETED | OUTPATIENT
Start: 2023-10-01 | End: 2023-10-01

## 2023-10-01 RX ORDER — KETOROLAC TROMETHAMINE 15 MG/ML
15 INJECTION, SOLUTION INTRAMUSCULAR; INTRAVENOUS
Status: DISCONTINUED | OUTPATIENT
Start: 2023-10-01 | End: 2023-10-01

## 2023-10-01 RX ORDER — METHOCARBAMOL 750 MG/1
750 TABLET, FILM COATED ORAL 4 TIMES DAILY
Qty: 20 TABLET | Refills: 0 | Status: SHIPPED | OUTPATIENT
Start: 2023-10-01 | End: 2023-10-06

## 2023-10-01 RX ORDER — ACETAMINOPHEN 500 MG
1000 TABLET ORAL EVERY 6 HOURS PRN
Qty: 40 TABLET | Refills: 0 | Status: SHIPPED | OUTPATIENT
Start: 2023-10-01 | End: 2023-10-06

## 2023-10-01 RX ORDER — METHOCARBAMOL 500 MG/1
1000 TABLET, FILM COATED ORAL ONCE
Status: COMPLETED | OUTPATIENT
Start: 2023-10-01 | End: 2023-10-01

## 2023-10-01 RX ORDER — ACETAMINOPHEN 500 MG
1000 TABLET ORAL ONCE
Status: COMPLETED | OUTPATIENT
Start: 2023-10-01 | End: 2023-10-01

## 2023-10-01 RX ADMIN — METHOCARBAMOL 1000 MG: 500 TABLET ORAL at 14:48

## 2023-10-01 RX ADMIN — KETOROLAC TROMETHAMINE 30 MG: 30 INJECTION, SOLUTION INTRAMUSCULAR; INTRAVENOUS at 14:49

## 2023-10-01 RX ADMIN — ACETAMINOPHEN 1000 MG: 500 TABLET ORAL at 14:48

## 2023-10-01 ASSESSMENT — PAIN - FUNCTIONAL ASSESSMENT: PAIN_FUNCTIONAL_ASSESSMENT: 0-10

## 2023-10-01 ASSESSMENT — PAIN SCALES - GENERAL: PAINLEVEL_OUTOF10: 9

## 2023-10-01 NOTE — ED TRIAGE NOTES
Pt states she is having right hand and right shoulder pain. States she is under the care of Dr. Mayra Marx and has an MRI set up for 10/8.

## 2023-10-01 NOTE — ED PROVIDER NOTES
[facility] EMERGENCY DEPARTMENT  EMERGENCY DEPARTMENT HISTORY AND PHYSICAL EXAM      Date: 10/1/2023  Patient Name: Myla Cortes  MRN: [id number]  [address] 1960  Date of evaluation: 10/1/2023  Provider: Bony Ibrahim MD   Note Started: 2:31 PM EDT 10/1/23    HISTORY OF PRESENT ILLNESS     Chief Complaint   Patient presents with    Hand Pain    Shoulder Pain       History Provided By: Patient    HPI: Myla Cortes is a 63 y.o. female with PMH of HTN, HLD, GERD, and CAD comes to the ED with hand pain. She reports that she has recurrence of hand pain on the right that is worsened by movement and feels very sore associated with ocular swelling. Denies any trauma. No fever, chills sweats. No numbness or weakness. She has history of chronic shoulder pain and hand pain. She is followed by Dr. Emeka Ruiz. She has an MRI planned on 10/9/2023. He is coming here due to the fact the pain has worsened this morning and she can go back to sleep. She otherwise denies any recent illnesses or change in her bowel, dater, renal habits. She does not have any other concerns or other symptoms today.     PAST MEDICAL HISTORY   Past Medical History:  Past Medical History:   Diagnosis Date    CAD (coronary artery disease)     GERD (gastroesophageal reflux disease)     Hypercholesterolemia     Hypertension        Past Surgical History:  Past Surgical History:   Procedure Laterality Date    COLONOSCOPY  08/28/2017    CORONARY ANGIOPLASTY WITH STENT PLACEMENT      HYSTERECTOMY, VAGINAL         Family History:  Family History   Problem Relation Age of Onset    Alzheimer's Disease Mother     Thyroid Disease Sister     Diabetes Father     Hypertension Mother     Osteoarthritis Mother     Hypertension Sister     Thyroid Disease Mother        Social History:  Social History     Tobacco Use    Smoking status: Some Days     Packs/day: 0.25     Years: 30.00     Additional pack years: 0.00     Total pack years: 7.50     Types: Cigarettes

## 2023-10-06 ENCOUNTER — OFFICE VISIT (OUTPATIENT)
Facility: CLINIC | Age: 63
End: 2023-10-06
Payer: COMMERCIAL

## 2023-10-06 ENCOUNTER — NURSE TRIAGE (OUTPATIENT)
Dept: OTHER | Facility: CLINIC | Age: 63
End: 2023-10-06

## 2023-10-06 VITALS
WEIGHT: 219 LBS | DIASTOLIC BLOOD PRESSURE: 102 MMHG | SYSTOLIC BLOOD PRESSURE: 164 MMHG | TEMPERATURE: 98.1 F | OXYGEN SATURATION: 98 % | HEIGHT: 67 IN | RESPIRATION RATE: 16 BRPM | HEART RATE: 107 BPM | BODY MASS INDEX: 34.37 KG/M2

## 2023-10-06 DIAGNOSIS — M25.511 CHRONIC PAIN OF BOTH SHOULDERS: Primary | ICD-10-CM

## 2023-10-06 DIAGNOSIS — M25.512 CHRONIC PAIN OF BOTH SHOULDERS: Primary | ICD-10-CM

## 2023-10-06 DIAGNOSIS — K21.9 GASTROESOPHAGEAL REFLUX DISEASE, UNSPECIFIED WHETHER ESOPHAGITIS PRESENT: ICD-10-CM

## 2023-10-06 DIAGNOSIS — I10 ESSENTIAL HYPERTENSION: ICD-10-CM

## 2023-10-06 DIAGNOSIS — G89.29 CHRONIC PAIN OF BOTH SHOULDERS: Primary | ICD-10-CM

## 2023-10-06 DIAGNOSIS — Z91.09 ENVIRONMENTAL ALLERGIES: ICD-10-CM

## 2023-10-06 DIAGNOSIS — E66.1 CLASS 1 DRUG-INDUCED OBESITY WITH SERIOUS COMORBIDITY AND BODY MASS INDEX (BMI) OF 34.0 TO 34.9 IN ADULT: ICD-10-CM

## 2023-10-06 DIAGNOSIS — F17.218 CIGARETTE NICOTINE DEPENDENCE WITH OTHER NICOTINE-INDUCED DISORDER: ICD-10-CM

## 2023-10-06 DIAGNOSIS — E78.00 PURE HYPERCHOLESTEROLEMIA: ICD-10-CM

## 2023-10-06 PROBLEM — E66.01 SEVERE OBESITY (BMI 35.0-39.9) WITH COMORBIDITY (HCC): Status: RESOLVED | Noted: 2020-10-28 | Resolved: 2023-10-06

## 2023-10-06 PROCEDURE — 3080F DIAST BP >= 90 MM HG: CPT

## 2023-10-06 PROCEDURE — 3077F SYST BP >= 140 MM HG: CPT

## 2023-10-06 PROCEDURE — 99214 OFFICE O/P EST MOD 30 MIN: CPT

## 2023-10-06 RX ORDER — NICOTINE 21 MG/24HR
1 PATCH, TRANSDERMAL 24 HOURS TRANSDERMAL DAILY
Qty: 14 PATCH | Refills: 0 | Status: SHIPPED | OUTPATIENT
Start: 2023-10-06

## 2023-10-06 RX ORDER — PREDNISONE 20 MG/1
TABLET ORAL
Qty: 6 TABLET | Refills: 0 | Status: SHIPPED | OUTPATIENT
Start: 2023-10-06

## 2023-10-06 RX ORDER — OMEPRAZOLE 40 MG/1
CAPSULE, DELAYED RELEASE ORAL
Qty: 90 CAPSULE | Refills: 0 | Status: SHIPPED | OUTPATIENT
Start: 2023-10-06

## 2023-10-06 RX ORDER — AMLODIPINE BESYLATE 10 MG/1
10 TABLET ORAL DAILY
Qty: 90 TABLET | Refills: 0 | Status: SHIPPED | OUTPATIENT
Start: 2023-10-06

## 2023-10-06 RX ORDER — FLUTICASONE PROPIONATE 50 MCG
2 SPRAY, SUSPENSION (ML) NASAL DAILY
Qty: 3 EACH | Refills: 0 | Status: SHIPPED | OUTPATIENT
Start: 2023-10-06

## 2023-10-06 RX ORDER — LOSARTAN POTASSIUM AND HYDROCHLOROTHIAZIDE 25; 100 MG/1; MG/1
1 TABLET ORAL DAILY
Qty: 90 TABLET | Refills: 0 | Status: SHIPPED | OUTPATIENT
Start: 2023-10-06

## 2023-10-06 RX ORDER — ATORVASTATIN CALCIUM 40 MG/1
40 TABLET, FILM COATED ORAL DAILY
Qty: 90 TABLET | Refills: 0 | Status: SHIPPED | OUTPATIENT
Start: 2023-10-06

## 2023-10-06 ASSESSMENT — ENCOUNTER SYMPTOMS
VOMITING: 0
CONSTIPATION: 0
TROUBLE SWALLOWING: 0
BLOOD IN STOOL: 0
CHEST TIGHTNESS: 0
SORE THROAT: 0
WHEEZING: 0
ABDOMINAL PAIN: 0
BACK PAIN: 0
NAUSEA: 0
PHOTOPHOBIA: 0
SHORTNESS OF BREATH: 0
EYE PAIN: 0
DIARRHEA: 0
COUGH: 0

## 2023-10-06 NOTE — TELEPHONE ENCOUNTER
[facility]    Received call from [address] at [facility] with Red Flag Complaint. Subjective: Caller states \"leg and arm weakness\"     Current Symptoms:   -shoulders to hands with weakness and numbness  -right hand swelling  -symptoms have been ongoing and unchanged since evaluation at [email or web address] ER    Onset: on-going \"for a while now\"    Pain Severity: 7/10; numbness    Temperature: denies fevers    What has been tried:   -steroids as prescribes  -ace wrap    Recommended disposition: See in Office Today or Tomorrow    Care advice provided, patient verbalizes understanding; denies any other questions or concerns; instructed to call back for any new or worsening symptoms. Patient/Caller agrees with recommended disposition; writer provided warm transfer to [facility] at [facility] for appointment scheduling    Attention Provider: Thank you for allowing me to participate in the care of your patient. The patient was connected to triage in response to information provided to the ECC/PSC. Please do not respond through this encounter as the response is not directed to a shared pool.     Reason for Disposition   Numbness (i.e., loss of sensation) in hand or fingers    Protocols used: Arm Pain-ADULT-[place]

## 2023-10-11 DIAGNOSIS — Z11.4 ENCOUNTER FOR SCREENING FOR HIV: ICD-10-CM

## 2023-10-11 DIAGNOSIS — I10 ESSENTIAL HYPERTENSION: ICD-10-CM

## 2023-10-11 DIAGNOSIS — Z11.59 NEED FOR HEPATITIS C SCREENING TEST: ICD-10-CM

## 2023-10-11 DIAGNOSIS — E78.00 PURE HYPERCHOLESTEROLEMIA: ICD-10-CM

## 2023-10-27 ENCOUNTER — TELEPHONE (OUTPATIENT)
Facility: CLINIC | Age: 63
End: 2023-10-27

## 2023-10-27 NOTE — TELEPHONE ENCOUNTER
----- Message from Elma Libman sent at 10/27/2023 10:14 AM EDT -----  Subject: Refill Request    QUESTIONS  Name of Medication? predniSONE (DELTASONE) 20 MG tablet  Patient-reported dosage and instructions? unknown  How many days do you have left? 0  Preferred Pharmacy? [address] [place] [address]  Pharmacy phone number (if available)? [phone]  Additional Information for Provider? Patient is requesting a refill on her   prednisone due to the swelling in her hands. She was unsure of the   previous dosage but is requesting it to be sent to [facility].  ---------------------------------------------------------------------------  --------------  [address]  What is the best way for the office to contact you? OK to leave message on   voicemail  Preferred Call Back Phone Number? [phone]  ---------------------------------------------------------------------------  --------------  SCRIPT ANSWERS  Relationship to Patient?  Self

## 2023-10-28 LAB
ALBUMIN SERPL-MCNC: 4 G/DL (ref 3.9–4.9)
ALBUMIN/GLOB SERPL: 1.3 {RATIO} (ref 1.2–2.2)
ALP SERPL-CCNC: 97 IU/L (ref 44–121)
ALT SERPL-CCNC: 28 IU/L (ref 0–32)
AST SERPL-CCNC: 20 IU/L (ref 0–40)
BILIRUB SERPL-MCNC: 0.3 MG/DL (ref 0–1.2)
BUN SERPL-MCNC: 13 MG/DL (ref 8–27)
BUN/CREAT SERPL: 19 (ref 12–28)
CALCIUM SERPL-MCNC: 10 MG/DL (ref 8.7–10.3)
CHLORIDE SERPL-SCNC: 105 MMOL/L (ref 96–106)
CHOLEST SERPL-MCNC: 206 MG/DL (ref 100–199)
CO2 SERPL-SCNC: 20 MMOL/L (ref 20–29)
CREAT SERPL-MCNC: 0.7 MG/DL (ref 0.57–1)
EGFRCR SERPLBLD CKD-EPI 2021: 98 ML/MIN/1.73
ERYTHROCYTE [DISTWIDTH] IN BLOOD BY AUTOMATED COUNT: 12.9 % (ref 11.7–15.4)
GLOBULIN SER CALC-MCNC: 3.2 G/DL (ref 1.5–4.5)
GLUCOSE SERPL-MCNC: 105 MG/DL (ref 70–99)
HCT VFR BLD AUTO: 38.7 % (ref 34–46.6)
HCV IGG SERPL QL IA: NON REACTIVE
HDLC SERPL-MCNC: 46 MG/DL
HGB BLD-MCNC: 12.5 G/DL (ref 11.1–15.9)
HIV 1+2 AB+HIV1 P24 AG SERPL QL IA: NON REACTIVE
LDLC SERPL CALC-MCNC: 140 MG/DL (ref 0–99)
MCH RBC QN AUTO: 26.5 PG (ref 26.6–33)
MCHC RBC AUTO-ENTMCNC: 32.3 G/DL (ref 31.5–35.7)
MCV RBC AUTO: 82 FL (ref 79–97)
PLATELET # BLD AUTO: 206 X10E3/UL (ref 150–450)
POTASSIUM SERPL-SCNC: 4.2 MMOL/L (ref 3.5–5.2)
PROT SERPL-MCNC: 7.2 G/DL (ref 6–8.5)
RBC # BLD AUTO: 4.71 X10E6/UL (ref 3.77–5.28)
SODIUM SERPL-SCNC: 141 MMOL/L (ref 134–144)
TRIGL SERPL-MCNC: 111 MG/DL (ref 0–149)
VLDLC SERPL CALC-MCNC: 20 MG/DL (ref 5–40)
WBC # BLD AUTO: 5.5 X10E3/UL (ref 3.4–10.8)

## 2023-10-31 ENCOUNTER — NURSE TRIAGE (OUTPATIENT)
Dept: OTHER | Facility: CLINIC | Age: 63
End: 2023-10-31

## 2023-10-31 ENCOUNTER — OFFICE VISIT (OUTPATIENT)
Facility: CLINIC | Age: 63
End: 2023-10-31
Payer: COMMERCIAL

## 2023-10-31 VITALS
DIASTOLIC BLOOD PRESSURE: 82 MMHG | RESPIRATION RATE: 16 BRPM | HEIGHT: 67 IN | TEMPERATURE: 97.3 F | BODY MASS INDEX: 34.06 KG/M2 | SYSTOLIC BLOOD PRESSURE: 141 MMHG | HEART RATE: 99 BPM | OXYGEN SATURATION: 95 % | WEIGHT: 217 LBS

## 2023-10-31 DIAGNOSIS — I10 ESSENTIAL HYPERTENSION: ICD-10-CM

## 2023-10-31 DIAGNOSIS — R55 SYNCOPE, UNSPECIFIED SYNCOPE TYPE: Primary | ICD-10-CM

## 2023-10-31 DIAGNOSIS — R06.2 WHEEZING: ICD-10-CM

## 2023-10-31 PROCEDURE — 99214 OFFICE O/P EST MOD 30 MIN: CPT

## 2023-10-31 PROCEDURE — 3077F SYST BP >= 140 MM HG: CPT

## 2023-10-31 PROCEDURE — 3079F DIAST BP 80-89 MM HG: CPT

## 2023-10-31 RX ORDER — ALBUTEROL SULFATE 90 UG/1
2 AEROSOL, METERED RESPIRATORY (INHALATION) 4 TIMES DAILY PRN
Qty: 18 G | Refills: 1 | Status: SHIPPED | OUTPATIENT
Start: 2023-10-31

## 2023-10-31 ASSESSMENT — ENCOUNTER SYMPTOMS
DIARRHEA: 0
SHORTNESS OF BREATH: 0
CONSTIPATION: 0
ABDOMINAL PAIN: 0
TROUBLE SWALLOWING: 0
BLOOD IN STOOL: 0
COUGH: 0
WHEEZING: 0
NAUSEA: 0
SORE THROAT: 0
PHOTOPHOBIA: 0
CHEST TIGHTNESS: 0
EYE PAIN: 0
VOMITING: 0
BACK PAIN: 0

## 2023-10-31 NOTE — TELEPHONE ENCOUNTER
[facility]    Received call from Jhonathan at [facility] with [employer] Subjective: Caller states \"I passed out at work yesterday sitting at my desk. Not sure how long I was out. \"     Current Symptoms:     Onset: 1 day ago;     Associated Symptoms:     Pain Severity:  left wrist - 7/10 aching    Temperature:  denies    What has been tried: sent home from work yesterday. Stayed home today. LMP: NA Pregnant: NA    Recommended disposition: See in Office Today    Care advice provided, patient verbalizes understanding; denies any other questions or concerns; instructed to call back for any new or worsening symptoms. Patient/Caller agrees with recommended disposition; writer provided warm transfer to Vasyl Wyatt at [facility] for appointment scheduling    Attention Provider: Thank you for allowing me to participate in the care of your patient. The patient was connected to triage in response to information provided to the ECC/PSC. Please do not respond through this encounter as the response is not directed to a shared pool.     Reason for Disposition   All other patients, and now alert and feels fine  (Exception: SIMPLE FAINT due to stress, pain, prolonged standing, or suddenly standing.)    Protocols used: Fainting-ADULT-[place]

## 2023-11-06 ENCOUNTER — OFFICE VISIT (OUTPATIENT)
Facility: CLINIC | Age: 63
End: 2023-11-06
Payer: COMMERCIAL

## 2023-11-06 VITALS
DIASTOLIC BLOOD PRESSURE: 88 MMHG | HEIGHT: 67 IN | OXYGEN SATURATION: 99 % | HEART RATE: 93 BPM | RESPIRATION RATE: 16 BRPM | WEIGHT: 225 LBS | TEMPERATURE: 97.3 F | BODY MASS INDEX: 35.31 KG/M2 | SYSTOLIC BLOOD PRESSURE: 131 MMHG

## 2023-11-06 DIAGNOSIS — M79.89 BILATERAL HAND SWELLING: Primary | ICD-10-CM

## 2023-11-06 PROCEDURE — 3079F DIAST BP 80-89 MM HG: CPT

## 2023-11-06 PROCEDURE — 99213 OFFICE O/P EST LOW 20 MIN: CPT

## 2023-11-06 PROCEDURE — 3075F SYST BP GE 130 - 139MM HG: CPT

## 2023-11-06 RX ORDER — PREDNISONE 20 MG/1
20 TABLET ORAL 2 TIMES DAILY
Qty: 10 TABLET | Refills: 0 | Status: SHIPPED | OUTPATIENT
Start: 2023-11-06 | End: 2023-11-11

## 2023-11-06 ASSESSMENT — ENCOUNTER SYMPTOMS
DIARRHEA: 0
SORE THROAT: 0
PHOTOPHOBIA: 0
BACK PAIN: 0
SHORTNESS OF BREATH: 0
VOMITING: 0
ABDOMINAL PAIN: 0
COUGH: 0
CONSTIPATION: 0
NAUSEA: 0
WHEEZING: 0
CHEST TIGHTNESS: 0
BLOOD IN STOOL: 0
TROUBLE SWALLOWING: 0
EYE PAIN: 0

## 2024-05-02 DIAGNOSIS — I10 ESSENTIAL HYPERTENSION: ICD-10-CM

## 2024-05-03 RX ORDER — LOSARTAN POTASSIUM AND HYDROCHLOROTHIAZIDE 25; 100 MG/1; MG/1
1 TABLET ORAL DAILY
Qty: 30 TABLET | Refills: 0 | Status: SHIPPED | OUTPATIENT
Start: 2024-05-03

## 2024-05-28 ENCOUNTER — OFFICE VISIT (OUTPATIENT)
Facility: CLINIC | Age: 64
End: 2024-05-28
Payer: COMMERCIAL

## 2024-05-28 VITALS
OXYGEN SATURATION: 97 % | TEMPERATURE: 98.7 F | HEIGHT: 67 IN | BODY MASS INDEX: 34.78 KG/M2 | WEIGHT: 221.6 LBS | RESPIRATION RATE: 18 BRPM | DIASTOLIC BLOOD PRESSURE: 66 MMHG | SYSTOLIC BLOOD PRESSURE: 123 MMHG | HEART RATE: 90 BPM

## 2024-05-28 DIAGNOSIS — I10 ESSENTIAL HYPERTENSION: Primary | ICD-10-CM

## 2024-05-28 DIAGNOSIS — Z12.12 ENCOUNTER FOR SCREENING FOR COLORECTAL MALIGNANT NEOPLASM: ICD-10-CM

## 2024-05-28 DIAGNOSIS — Z12.31 SCREENING MAMMOGRAM FOR BREAST CANCER: ICD-10-CM

## 2024-05-28 DIAGNOSIS — Z91.09 ENVIRONMENTAL ALLERGIES: ICD-10-CM

## 2024-05-28 DIAGNOSIS — R06.2 WHEEZING: ICD-10-CM

## 2024-05-28 DIAGNOSIS — R73.03 PREDIABETES: ICD-10-CM

## 2024-05-28 DIAGNOSIS — K21.9 GASTROESOPHAGEAL REFLUX DISEASE, UNSPECIFIED WHETHER ESOPHAGITIS PRESENT: ICD-10-CM

## 2024-05-28 DIAGNOSIS — Z12.11 ENCOUNTER FOR SCREENING FOR COLORECTAL MALIGNANT NEOPLASM: ICD-10-CM

## 2024-05-28 DIAGNOSIS — E78.00 PURE HYPERCHOLESTEROLEMIA: ICD-10-CM

## 2024-05-28 DIAGNOSIS — F17.218 CIGARETTE NICOTINE DEPENDENCE WITH OTHER NICOTINE-INDUCED DISORDER: ICD-10-CM

## 2024-05-28 PROCEDURE — 99214 OFFICE O/P EST MOD 30 MIN: CPT

## 2024-05-28 PROCEDURE — 3074F SYST BP LT 130 MM HG: CPT

## 2024-05-28 PROCEDURE — 3078F DIAST BP <80 MM HG: CPT

## 2024-05-28 RX ORDER — NICOTINE 21 MG/24HR
1 PATCH, TRANSDERMAL 24 HOURS TRANSDERMAL DAILY
Qty: 14 PATCH | Refills: 5 | Status: SHIPPED | OUTPATIENT
Start: 2024-05-28

## 2024-05-28 RX ORDER — CETIRIZINE HYDROCHLORIDE 10 MG/1
10 TABLET ORAL NIGHTLY
Qty: 90 TABLET | Refills: 0 | Status: SHIPPED | OUTPATIENT
Start: 2024-05-28

## 2024-05-28 RX ORDER — AMLODIPINE BESYLATE 10 MG/1
10 TABLET ORAL DAILY
Qty: 90 TABLET | Refills: 0 | Status: SHIPPED | OUTPATIENT
Start: 2024-05-28

## 2024-05-28 RX ORDER — OMEPRAZOLE 40 MG/1
CAPSULE, DELAYED RELEASE ORAL
Qty: 90 CAPSULE | Refills: 0 | Status: SHIPPED | OUTPATIENT
Start: 2024-05-28

## 2024-05-28 RX ORDER — FLUTICASONE PROPIONATE 50 MCG
2 SPRAY, SUSPENSION (ML) NASAL DAILY
Qty: 3 EACH | Refills: 0 | Status: SHIPPED | OUTPATIENT
Start: 2024-05-28

## 2024-05-28 RX ORDER — ALBUTEROL SULFATE 90 UG/1
2 AEROSOL, METERED RESPIRATORY (INHALATION) 4 TIMES DAILY PRN
Qty: 18 G | Refills: 2 | Status: SHIPPED | OUTPATIENT
Start: 2024-05-28

## 2024-05-28 RX ORDER — ATORVASTATIN CALCIUM 40 MG/1
40 TABLET, FILM COATED ORAL DAILY
Qty: 90 TABLET | Refills: 0 | Status: SHIPPED | OUTPATIENT
Start: 2024-05-28

## 2024-05-28 RX ORDER — LOSARTAN POTASSIUM AND HYDROCHLOROTHIAZIDE 25; 100 MG/1; MG/1
1 TABLET ORAL DAILY
Qty: 90 TABLET | Refills: 0 | Status: SHIPPED | OUTPATIENT
Start: 2024-05-28

## 2024-05-28 ASSESSMENT — ENCOUNTER SYMPTOMS
WHEEZING: 0
TROUBLE SWALLOWING: 0
BACK PAIN: 0
BLOOD IN STOOL: 0
PHOTOPHOBIA: 0
ABDOMINAL PAIN: 0
VOMITING: 0
NAUSEA: 0
CHEST TIGHTNESS: 0
EYE PAIN: 0
COUGH: 0
SORE THROAT: 0
SHORTNESS OF BREATH: 0
DIARRHEA: 0
CONSTIPATION: 0

## 2024-05-28 ASSESSMENT — PATIENT HEALTH QUESTIONNAIRE - PHQ9
SUM OF ALL RESPONSES TO PHQ QUESTIONS 1-9: 0
2. FEELING DOWN, DEPRESSED OR HOPELESS: NOT AT ALL
SUM OF ALL RESPONSES TO PHQ QUESTIONS 1-9: 0

## 2024-05-28 NOTE — PROGRESS NOTES
Chief Complaint   Patient presents with    Follow-up Chronic Condition    Hypertension    Other     Pt reports right foot pain and swelling     BP (!) 140/78 (Site: Left Upper Arm, Position: Sitting, Cuff Size: Large Adult)   Pulse 87   Temp 98.7 °F (37.1 °C) (Oral)   Resp 18   Ht 1.702 m (5' 7\")   Wt 100.5 kg (221 lb 9.6 oz)   SpO2 97%   BMI 34.71 kg/m²     \"Have you been to the ER, urgent care clinic since your last visit?  Hospitalized since your last visit?\"    YES - When: approximately 2  weeks ago.  [facility]    “Have you seen or consulted any other health care providers outside of [facility] since your last visit?”    YES - When: approximately 7  weeks ago.  Where and Why: YES - When: approximately 2  weeks ago.  Where and Why[facility]        “Have you had a colorectal cancer screening such as a colonoscopy/FIT/Cologuard?    NO    Date of last Colonoscopy: 8/28/2017  No cologuard on file  No FIT/FOBT on file   No flexible sigmoidoscopy on file         Click Here for Release of Records Request  
Future  8. Encounter for screening for colorectal malignant neoplasm  -     [facility] - Carmella Fox MD, Gastroenterology, [place]  9. Cigarette nicotine dependence with other nicotine-induced disorder  Comments:  Continue nicotine patches daily. Discussed pt not smoking while using patches  Orders:  -     nicotine (NICODERM CQ) 14 MG/24HR; Place 1 patch onto the skin daily, Disp-14 patch, R-5Normal       Return in about 3 months (around 8/28/2024) for HTN, Choles., Tobacco.  History of Present Illness   Anthony Yeboah is a 63 y.o. female with past medical history listed, who presents to clinic today for a follow up visit on chronic conditions.     Right foot pain- seeing Dr. Faye.  Scheduled for MRI this Saturday  Started on MTX for relief for her hands by Rheumatology for treatment of suspected RA.  She says it caused her nausea and vomiting with both weekly doses & subsequently d/c. She says she did not contact specialist to make them aware of side effects.  Was on diclofenac but no longer taking because it was not helping.    HLD: Patient has been taking atorvastatin 40 mg for hyperlipidemia.     HTN: Patient has been diagnosed hypertension.  Currently taking losartan hydrochlorothiazide 100-25, Norvasc 10mg.      GERD: Patient has been diagnosed with GERD she is currently taking omeprazole 40 capsules daily     She has seasonal allergies. Taking zytrec but not seeing improvement.     She is using cigarettes daily.  She was to start taking patches daily to help with her smoking cessation but stopped because she ran out.    Health Maintenance  Health Maintenance Due   Topic Date Due    Pneumococcal 0-64 years Vaccine (1 of 2 - PCV) Never done    DTaP/Tdap/Td vaccine (1 - Tdap) Never done    Shingles vaccine (1 of 2) Never done    Colorectal Cancer Screen  08/28/2018    Respiratory Syncytial Virus (RSV) Pregnant or age 60 yrs+ (1 - 1-dose 60+ series) Never done    COVID-19 Vaccine (3 - 2023-24 season) 09/01/2023

## 2024-07-01 ENCOUNTER — OFFICE VISIT (OUTPATIENT)
Facility: CLINIC | Age: 64
End: 2024-07-01
Payer: COMMERCIAL

## 2024-07-01 VITALS
TEMPERATURE: 98.4 F | BODY MASS INDEX: 34.88 KG/M2 | RESPIRATION RATE: 18 BRPM | OXYGEN SATURATION: 97 % | WEIGHT: 222.2 LBS | HEART RATE: 93 BPM | SYSTOLIC BLOOD PRESSURE: 116 MMHG | DIASTOLIC BLOOD PRESSURE: 69 MMHG | HEIGHT: 67 IN

## 2024-07-01 DIAGNOSIS — Z01.818 PREOPERATIVE CLEARANCE: ICD-10-CM

## 2024-07-01 DIAGNOSIS — Z95.5 HISTORY OF HEART ARTERY STENT: ICD-10-CM

## 2024-07-01 DIAGNOSIS — Z71.6 TOBACCO ABUSE COUNSELING: ICD-10-CM

## 2024-07-01 DIAGNOSIS — I51.7 LAE (LEFT ATRIAL ENLARGEMENT): ICD-10-CM

## 2024-07-01 DIAGNOSIS — M25.371 RIGHT ANKLE INSTABILITY: Primary | ICD-10-CM

## 2024-07-01 DIAGNOSIS — F17.200 TOBACCO DEPENDENCE: ICD-10-CM
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
## 2024-07-01 DIAGNOSIS — M76.829 POSTERIOR TIBIAL TENDON DYSFUNCTION: ICD-10-CM

## 2024-07-01 PROCEDURE — 93000 ELECTROCARDIOGRAM COMPLETE: CPT | Performed by: FAMILY MEDICINE

## 2024-07-01 PROCEDURE — 99214 OFFICE O/P EST MOD 30 MIN: CPT | Performed by: FAMILY MEDICINE

## 2024-07-01 PROCEDURE — 3074F SYST BP LT 130 MM HG: CPT | Performed by: FAMILY MEDICINE

## 2024-07-01 PROCEDURE — 3078F DIAST BP <80 MM HG: CPT | Performed by: FAMILY MEDICINE

## 2024-07-01 NOTE — PROGRESS NOTES
Chief Complaint   Patient presents with    Pre-op Exam     Pt here for pre-op clearance for surgery to her right ankle      /69 (Site: Right Upper Arm, Position: Sitting, Cuff Size: Large Adult)   Pulse 93   Temp 98.4 °F (36.9 °C) (Oral)   Resp 18   Ht 1.702 m (5' 7\")   Wt 100.8 kg (222 lb 3.2 oz)   SpO2 97%   BMI 34.80 kg/m²     \"Have you been to the ER, urgent care clinic since your last visit?  Hospitalized since your last visit?\"    NO    “Have you seen or consulted any other health care providers outside of [facility] since your last visit?”    NO        “Have you had a colorectal cancer screening such as a colonoscopy/FIT/Cologuard?    YES - Type: Colonoscopy - Where: 8/28/2017    Date of last Colonoscopy: 8/28/2017  No cologuard on file  No FIT/FOBT on file   No flexible sigmoidoscopy on file         Click Here for Release of Records Request

## 2024-07-01 NOTE — PROGRESS NOTES
Anthony Yeboah is a 63 y.o. female and presents with Pre-op Exam (Pt here for pre-op clearance for surgery to her right ankle )  .  HPI with a hx of HTN and Hyperlipidemia  63 y.o. Patient here for a preop clearance for right ankle surgery to correct a bone on tendon stress(post. Tibial tendon dysfunction    Subjective:  Cardiovascular Review:  The patient has hypertension   Diet and Lifestyle: generally follows a low fat low cholesterol diet, generally follows a low sodium diet, exercises sporadically  Home BP Monitoring: is not measured at home.  Pertinent ROS: taking medications as instructed, no medication side effects noted, no TIA's, no chest pain on exertion, no dyspnea on exertion, no swelling of ankles.     Review of Systems  Review of Systems - Musculoskeletal ROS: positive for - pain in right ankle       Past Medical History:   Diagnosis Date    CAD (coronary artery disease)     GERD (gastroesophageal reflux disease)     Hypercholesterolemia     Hypertension      Past Surgical History:   Procedure Laterality Date    COLONOSCOPY  08/28/2017    CORONARY ANGIOPLASTY WITH STENT PLACEMENT      HYSTERECTOMY, VAGINAL       Social History     Socioeconomic History    Marital status:      Spouse name: None    Number of children: None    Years of education: None    Highest education level: None   Tobacco Use    Smoking status: Some Days     Current packs/day: 0.25     Average packs/day: 0.3 packs/day for 35.5 years (8.9 ttl pk-yrs)     Types: Cigarettes     Start date: 1/1/1989    Smokeless tobacco: Never   Vaping Use    Vaping Use: Never used   Substance and Sexual Activity    Alcohol use: Yes     Comment: occasional    Drug use: Not Currently     Social Determinants of Health     Financial Resource Strain: Low Risk  (7/11/2023)    Overall Financial Resource Strain (CARDIA)     Difficulty of Paying Living Expenses: Not very hard   Transportation Needs: Unknown (7/11/2023)    PRAPARE - Transportation

## 2024-07-03 LAB
ALBUMIN SERPL-MCNC: 4 G/DL (ref 3.9–4.9)
ALP SERPL-CCNC: 89 IU/L (ref 44–121)
ALT SERPL-CCNC: 11 IU/L (ref 0–32)
AST SERPL-CCNC: 13 IU/L (ref 0–40)
BASOPHILS # BLD AUTO: 0 X10E3/UL (ref 0–0.2)
BASOPHILS NFR BLD AUTO: 1 %
BILIRUB SERPL-MCNC: 0.4 MG/DL (ref 0–1.2)
BUN SERPL-MCNC: 13 MG/DL (ref 8–27)
BUN/CREAT SERPL: 16 (ref 12–28)
CALCIUM SERPL-MCNC: 9.4 MG/DL (ref 8.7–10.3)
CHLORIDE SERPL-SCNC: 107 MMOL/L (ref 96–106)
CO2 SERPL-SCNC: 23 MMOL/L (ref 20–29)
CREAT SERPL-MCNC: 0.82 MG/DL (ref 0.57–1)
EGFRCR SERPLBLD CKD-EPI 2021: 81 ML/MIN/1.73
EOSINOPHIL # BLD AUTO: 0.1 X10E3/UL (ref 0–0.4)
EOSINOPHIL NFR BLD AUTO: 2 %
ERYTHROCYTE [DISTWIDTH] IN BLOOD BY AUTOMATED COUNT: 13.3 % (ref 11.7–15.4)
GLOBULIN SER CALC-MCNC: 3.1 G/DL (ref 1.5–4.5)
GLUCOSE SERPL-MCNC: 88 MG/DL (ref 70–99)
HCT VFR BLD AUTO: 41.9 % (ref 34–46.6)
HGB BLD-MCNC: 12.9 G/DL (ref 11.1–15.9)
IMM GRANULOCYTES # BLD AUTO: 0 X10E3/UL (ref 0–0.1)
IMM GRANULOCYTES NFR BLD AUTO: 0 %
INR PPP: 1 (ref 0.9–1.2)
LYMPHOCYTES # BLD AUTO: 2.4 X10E3/UL (ref 0.7–3.1)
LYMPHOCYTES NFR BLD AUTO: 46 %
MCH RBC QN AUTO: 26 PG (ref 26.6–33)
MCHC RBC AUTO-ENTMCNC: 30.8 G/DL (ref 31.5–35.7)
MCV RBC AUTO: 85 FL (ref 79–97)
MONOCYTES # BLD AUTO: 0.5 X10E3/UL (ref 0.1–0.9)
MONOCYTES NFR BLD AUTO: 9 %
NEUTROPHILS # BLD AUTO: 2.2 X10E3/UL (ref 1.4–7)
NEUTROPHILS NFR BLD AUTO: 42 %
PLATELET # BLD AUTO: 185 X10E3/UL (ref 150–450)
POTASSIUM SERPL-SCNC: 4.3 MMOL/L (ref 3.5–5.2)
PROT SERPL-MCNC: 7.1 G/DL (ref 6–8.5)
PROTHROMBIN TIME: 10.9 SEC (ref 9.1–12)
RBC # BLD AUTO: 4.96 X10E6/UL (ref 3.77–5.28)
SODIUM SERPL-SCNC: 142 MMOL/L (ref 134–144)
WBC # BLD AUTO: 5.2 X10E3/UL (ref 3.4–10.8)
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
## 2024-07-31 PROBLEM — Z01.818 PREOPERATIVE CLEARANCE: Status: RESOLVED | Noted: 2024-07-01 | Resolved: 2024-07-31

## 2024-08-28 DIAGNOSIS — E78.00 PURE HYPERCHOLESTEROLEMIA: ICD-10-CM

## 2024-08-28 DIAGNOSIS — I10 ESSENTIAL HYPERTENSION: ICD-10-CM

## 2024-09-05 ENCOUNTER — OFFICE VISIT (OUTPATIENT)
Facility: CLINIC | Age: 64
End: 2024-09-05
Payer: COMMERCIAL

## 2024-09-05 VITALS
HEIGHT: 67 IN | DIASTOLIC BLOOD PRESSURE: 86 MMHG | HEART RATE: 97 BPM | SYSTOLIC BLOOD PRESSURE: 148 MMHG | TEMPERATURE: 97.9 F | WEIGHT: 226 LBS | RESPIRATION RATE: 16 BRPM | OXYGEN SATURATION: 97 % | BODY MASS INDEX: 35.47 KG/M2

## 2024-09-05 DIAGNOSIS — I10 ESSENTIAL HYPERTENSION: Primary | ICD-10-CM

## 2024-09-05 DIAGNOSIS — I10 ESSENTIAL HYPERTENSION: ICD-10-CM

## 2024-09-05 DIAGNOSIS — F17.218 CIGARETTE NICOTINE DEPENDENCE WITH OTHER NICOTINE-INDUCED DISORDER: ICD-10-CM

## 2024-09-05 DIAGNOSIS — E66.01 SEVERE OBESITY (BMI 35.0-39.9) WITH COMORBIDITY (HCC): ICD-10-CM

## 2024-09-05 DIAGNOSIS — E78.00 PURE HYPERCHOLESTEROLEMIA: ICD-10-CM

## 2024-09-05 DIAGNOSIS — K21.9 GASTROESOPHAGEAL REFLUX DISEASE, UNSPECIFIED WHETHER ESOPHAGITIS PRESENT: ICD-10-CM

## 2024-09-05 DIAGNOSIS — R06.2 WHEEZING: ICD-10-CM

## 2024-09-05 DIAGNOSIS — Z12.31 SCREENING MAMMOGRAM FOR BREAST CANCER: ICD-10-CM

## 2024-09-05 DIAGNOSIS — Z91.09 ENVIRONMENTAL ALLERGIES: ICD-10-CM

## 2024-09-05 DIAGNOSIS — M79.609 POPLITEAL PAIN: ICD-10-CM

## 2024-09-05 PROCEDURE — 3077F SYST BP >= 140 MM HG: CPT

## 2024-09-05 PROCEDURE — 99214 OFFICE O/P EST MOD 30 MIN: CPT

## 2024-09-05 PROCEDURE — 3079F DIAST BP 80-89 MM HG: CPT

## 2024-09-05 RX ORDER — ATORVASTATIN CALCIUM 40 MG/1
40 TABLET, FILM COATED ORAL DAILY
Qty: 90 TABLET | Refills: 0 | Status: SHIPPED | OUTPATIENT
Start: 2024-09-05

## 2024-09-05 RX ORDER — AMLODIPINE BESYLATE 10 MG/1
10 TABLET ORAL DAILY
Qty: 90 TABLET | Refills: 0 | Status: SHIPPED | OUTPATIENT
Start: 2024-09-05

## 2024-09-05 RX ORDER — ALBUTEROL SULFATE 90 UG/1
2 AEROSOL, METERED RESPIRATORY (INHALATION) 4 TIMES DAILY PRN
Qty: 18 G | Refills: 2 | Status: SHIPPED | OUTPATIENT
Start: 2024-09-05

## 2024-09-05 RX ORDER — CETIRIZINE HYDROCHLORIDE 10 MG/1
10 TABLET ORAL NIGHTLY
Qty: 90 TABLET | Refills: 0 | Status: SHIPPED | OUTPATIENT
Start: 2024-09-05

## 2024-09-05 RX ORDER — LOSARTAN POTASSIUM AND HYDROCHLOROTHIAZIDE 25; 100 MG/1; MG/1
1 TABLET ORAL DAILY
Qty: 90 TABLET | Refills: 0 | Status: SHIPPED | OUTPATIENT
Start: 2024-09-05

## 2024-09-05 RX ORDER — OMEPRAZOLE 40 MG/1
CAPSULE, DELAYED RELEASE ORAL
Qty: 90 CAPSULE | Refills: 0 | Status: SHIPPED | OUTPATIENT
Start: 2024-09-05

## 2024-09-05 RX ORDER — NICOTINE 21 MG/24HR
1 PATCH, TRANSDERMAL 24 HOURS TRANSDERMAL DAILY
Qty: 14 PATCH | Refills: 5 | Status: SHIPPED | OUTPATIENT
Start: 2024-09-05

## 2024-09-05 SDOH — ECONOMIC STABILITY: INCOME INSECURITY: HOW HARD IS IT FOR YOU TO PAY FOR THE VERY BASICS LIKE FOOD, HOUSING, MEDICAL CARE, AND HEATING?: NOT HARD AT ALL

## 2024-09-05 SDOH — ECONOMIC STABILITY: FOOD INSECURITY: WITHIN THE PAST 12 MONTHS, YOU WORRIED THAT YOUR FOOD WOULD RUN OUT BEFORE YOU GOT MONEY TO BUY MORE.: NEVER TRUE

## 2024-09-05 SDOH — ECONOMIC STABILITY: FOOD INSECURITY: WITHIN THE PAST 12 MONTHS, THE FOOD YOU BOUGHT JUST DIDN'T LAST AND YOU DIDN'T HAVE MONEY TO GET MORE.: NEVER TRUE

## 2024-09-05 ASSESSMENT — ENCOUNTER SYMPTOMS
WHEEZING: 0
CHEST TIGHTNESS: 0
DIARRHEA: 0
CONSTIPATION: 0
VOMITING: 0
NAUSEA: 0
SHORTNESS OF BREATH: 0
ABDOMINAL PAIN: 0
COUGH: 0

## 2024-09-05 NOTE — PROGRESS NOTES
\"Have you been to the ER, urgent care clinic since your last visit?  Hospitalized since your last visit?\"    NO    “Have you seen or consulted any other health care providers outside of [facility] since your last visit?”    NO        “Have you had a colorectal cancer screening such as a colonoscopy/FIT/Cologuard?    NO    Date of last Colonoscopy: 8/28/2017  No cologuard on file  No FIT/FOBT on file   No flexible sigmoidoscopy on file     Chief Complaint   Patient presents with    Follow-up    Cholesterol Problem    Hypertension    Obesity     BP (!) 160/90 (Site: Left Upper Arm, Position: Sitting, Cuff Size: Medium Adult)   Pulse 97   Temp 97.9 °F (36.6 °C) (Temporal)   Resp 16   Ht 1.702 m (5' 7\")   Wt 102.5 kg (226 lb)   SpO2 97%   BMI 35.40 kg/m²       Click Here for Release of Records Request  
    Family History   Problem Relation Age of Onset    Alzheimer's Disease Mother     Thyroid Disease Sister     Diabetes Father     Hypertension Mother     Osteoarthritis Mother     Hypertension Sister     Thyroid Disease Mother      Social History     Tobacco Use    Smoking status: Some Days     Current packs/day: 0.25     Average packs/day: 0.3 packs/day for 35.7 years (8.9 ttl pk-yrs)     Types: Cigarettes     Start date: 1/1/1989    Smokeless tobacco: Never   Substance Use Topics    Alcohol use: Yes     Comment: occasional       No Known Allergies    Disposition   No follow-up provider specified.  Future Appointments   Date Time Provider Department Center   12/5/2024  3:30 PM Jennifer Gould PA-C [facility]

## 2024-09-20 ENCOUNTER — HOSPITAL ENCOUNTER (OUTPATIENT)
Facility: HOSPITAL | Age: 64
Discharge: HOME | End: 2024-09-22
Payer: COMMERCIAL

## 2024-09-20 ENCOUNTER — HOSPITAL ENCOUNTER (OUTPATIENT)
Facility: HOSPITAL | Age: 64
Discharge: HOME | End: 2024-09-23
Payer: COMMERCIAL

## 2024-09-20 DIAGNOSIS — M79.609 POPLITEAL PAIN: ICD-10-CM

## 2024-09-20 DIAGNOSIS — Z12.31 SCREENING MAMMOGRAM FOR BREAST CANCER: ICD-10-CM

## 2024-09-20 PROCEDURE — 93971 EXTREMITY STUDY: CPT

## 2024-09-20 PROCEDURE — 77063 BREAST TOMOSYNTHESIS BI: CPT

## 2024-09-21 PROCEDURE — 93971 EXTREMITY STUDY: CPT | Performed by: SURGERY

## 2024-10-19 PROBLEM — M79.609 POPLITEAL PAIN: Status: ACTIVE | Noted: 2024-10-19

## 2024-11-05 ENCOUNTER — APPOINTMENT (OUTPATIENT)
Facility: HOSPITAL | Age: 64
End: 2024-11-05
Payer: COMMERCIAL

## 2024-11-05 ENCOUNTER — HOSPITAL ENCOUNTER (EMERGENCY)
Facility: HOSPITAL | Age: 64
Discharge: HOME | End: 2024-11-05
Attending: EMERGENCY MEDICINE
Payer: COMMERCIAL

## 2024-11-05 VITALS
HEART RATE: 93 BPM | BODY MASS INDEX: 33.19 KG/M2 | OXYGEN SATURATION: 100 % | RESPIRATION RATE: 16 BRPM | TEMPERATURE: 98.1 F | WEIGHT: 219 LBS | DIASTOLIC BLOOD PRESSURE: 109 MMHG | SYSTOLIC BLOOD PRESSURE: 166 MMHG | HEIGHT: 68 IN

## 2024-11-05 DIAGNOSIS — M19.011 PRIMARY OSTEOARTHRITIS OF RIGHT SHOULDER: Primary | ICD-10-CM

## 2024-11-05 PROCEDURE — 6360000002 HC RX W HCPCS: Performed by: EMERGENCY MEDICINE

## 2024-11-05 PROCEDURE — 6370000000 HC RX 637 (ALT 250 FOR IP): Performed by: EMERGENCY MEDICINE

## 2024-11-05 PROCEDURE — 96372 THER/PROPH/DIAG INJ SC/IM: CPT

## 2024-11-05 PROCEDURE — 73030 X-RAY EXAM OF SHOULDER: CPT

## 2024-11-05 PROCEDURE — 99284 EMERGENCY DEPT VISIT MOD MDM: CPT

## 2024-11-05 RX ORDER — METHOCARBAMOL 500 MG/1
1000 TABLET, FILM COATED ORAL ONCE
Status: COMPLETED | OUTPATIENT
Start: 2024-11-05 | End: 2024-11-05

## 2024-11-05 RX ORDER — NAPROXEN SODIUM 550 MG/1
550 TABLET ORAL 2 TIMES DAILY WITH MEALS
Qty: 60 TABLET | Refills: 3 | Status: SHIPPED | OUTPATIENT
Start: 2024-11-05

## 2024-11-05 RX ORDER — METHYLPREDNISOLONE 4 MG/1
TABLET ORAL
Qty: 1 KIT | Refills: 0 | Status: SHIPPED | OUTPATIENT
Start: 2024-11-05

## 2024-11-05 RX ORDER — KETOROLAC TROMETHAMINE 30 MG/ML
30 INJECTION, SOLUTION INTRAMUSCULAR; INTRAVENOUS
Status: COMPLETED | OUTPATIENT
Start: 2024-11-05 | End: 2024-11-05

## 2024-11-05 RX ADMIN — METHOCARBAMOL 1000 MG: 500 TABLET ORAL at 10:24

## 2024-11-05 RX ADMIN — KETOROLAC TROMETHAMINE 30 MG: 30 INJECTION, SOLUTION INTRAMUSCULAR at 10:24

## 2024-11-05 ASSESSMENT — PAIN DESCRIPTION - LOCATION: LOCATION: SHOULDER

## 2024-11-05 ASSESSMENT — PAIN DESCRIPTION - ORIENTATION: ORIENTATION: RIGHT

## 2024-11-05 ASSESSMENT — PAIN - FUNCTIONAL ASSESSMENT: PAIN_FUNCTIONAL_ASSESSMENT: 0-10

## 2024-11-05 ASSESSMENT — PAIN SCALES - GENERAL: PAINLEVEL_OUTOF10: 10

## 2024-11-05 NOTE — DISCHARGE INSTRUCTIONS
Thank you for choosing our Emergency Department for your care.  It is our privilege to care for you in your time of need.  In the next several days, you may receive a survey via email or mailed to your home about your experience with our team.  We would greatly appreciate you taking a few minutes to complete the survey, as we use this information to learn what we have done well and what we could be doing better. Thank you for trusting us with your care!    Below you will find a list of your tests from today's visit.   Labs  No results found for this or any previous visit (from the past 12 hour(s)).    Radiologic Studies  XR SHOULDER RIGHT (MIN 2 VIEWS)    (Results Pending)     ------------------------------------------------------------------------------------------------------------  The evaluation and treatment you received in the Emergency Department were for an urgent problem. It is important that you follow-up with a doctor, nurse practitioner, or physician assistant to:  (1) confirm your diagnosis,  (2) re-evaluation of changes in your illness and treatment, and (3) for ongoing care. Please take your discharge instructions with you when you go to your follow-up appointment.     If you have any problem arranging a follow-up appointment, contact us!  If your symptoms become worse or you do not improve as expected, please return to us. We are available 24 hours a day.     If a prescription has been provided, please fill it as soon as possible to prevent a delay in treatment. If you have any questions or reservations about taking the medication due to side effects or interactions with other medications, please call your primary care provider or contact us directly.  Again, THANK YOU for choosing us to care for YOU!

## 2024-11-05 NOTE — ED PROVIDER NOTES
(electronically signed)    (Please note that parts of this dictation were completed with voice recognition software. Quite often unanticipated grammatical, syntax, homophones, and other interpretive errors are inadvertently transcribed by the computer software. Please disregards these errors. Please excuse any errors that have escaped final proofreading.)     Bakari Molina MD  11/05/24 8030

## 2025-02-06 ENCOUNTER — OFFICE VISIT (OUTPATIENT)
Facility: CLINIC | Age: 65
End: 2025-02-06

## 2025-02-06 VITALS
TEMPERATURE: 97.8 F | WEIGHT: 229 LBS | HEART RATE: 83 BPM | DIASTOLIC BLOOD PRESSURE: 88 MMHG | RESPIRATION RATE: 16 BRPM | BODY MASS INDEX: 35.94 KG/M2 | HEIGHT: 67 IN | OXYGEN SATURATION: 100 % | SYSTOLIC BLOOD PRESSURE: 138 MMHG

## 2025-02-06 DIAGNOSIS — G89.29 CHRONIC PAIN OF BOTH SHOULDERS: ICD-10-CM

## 2025-02-06 DIAGNOSIS — Z12.11 ENCOUNTER FOR SCREENING FOR COLORECTAL MALIGNANT NEOPLASM: ICD-10-CM

## 2025-02-06 DIAGNOSIS — E78.00 PURE HYPERCHOLESTEROLEMIA: ICD-10-CM

## 2025-02-06 DIAGNOSIS — R53.83 FATIGUE, UNSPECIFIED TYPE: ICD-10-CM

## 2025-02-06 DIAGNOSIS — E55.9 VITAMIN D DEFICIENCY: ICD-10-CM

## 2025-02-06 DIAGNOSIS — M25.511 CHRONIC PAIN OF BOTH SHOULDERS: ICD-10-CM

## 2025-02-06 DIAGNOSIS — M25.512 CHRONIC PAIN OF BOTH SHOULDERS: ICD-10-CM

## 2025-02-06 DIAGNOSIS — M05.79 RHEUMATOID ARTHRITIS INVOLVING MULTIPLE SITES WITH POSITIVE RHEUMATOID FACTOR (HCC): Primary | ICD-10-CM

## 2025-02-06 DIAGNOSIS — Z12.12 ENCOUNTER FOR SCREENING FOR COLORECTAL MALIGNANT NEOPLASM: ICD-10-CM

## 2025-02-06 DIAGNOSIS — K21.9 GASTROESOPHAGEAL REFLUX DISEASE, UNSPECIFIED WHETHER ESOPHAGITIS PRESENT: ICD-10-CM

## 2025-02-06 DIAGNOSIS — M06.9 MYOSITIS OF SHOULDER, UNSPECIFIED LATERALITY, UNSPECIFIED MYOSITIS TYPE (HCC): ICD-10-CM

## 2025-02-06 DIAGNOSIS — R73.03 PREDIABETES: ICD-10-CM

## 2025-02-06 DIAGNOSIS — I10 ESSENTIAL HYPERTENSION: ICD-10-CM

## 2025-02-06 DIAGNOSIS — Z91.09 ENVIRONMENTAL ALLERGIES: ICD-10-CM

## 2025-02-06 RX ORDER — OMEPRAZOLE 40 MG/1
CAPSULE, DELAYED RELEASE ORAL
Qty: 90 CAPSULE | Refills: 0 | Status: SHIPPED | OUTPATIENT
Start: 2025-02-06

## 2025-02-06 RX ORDER — AMLODIPINE BESYLATE 10 MG/1
10 TABLET ORAL DAILY
Qty: 90 TABLET | Refills: 0 | Status: SHIPPED | OUTPATIENT
Start: 2025-02-06

## 2025-02-06 RX ORDER — ATORVASTATIN CALCIUM 40 MG/1
40 TABLET, FILM COATED ORAL DAILY
Qty: 90 TABLET | Refills: 0 | Status: SHIPPED | OUTPATIENT
Start: 2025-02-06

## 2025-02-06 RX ORDER — CETIRIZINE HYDROCHLORIDE 10 MG/1
10 TABLET ORAL NIGHTLY
Qty: 90 TABLET | Refills: 0 | Status: SHIPPED | OUTPATIENT
Start: 2025-02-06

## 2025-02-06 RX ORDER — PREDNISONE 20 MG/1
20 TABLET ORAL 2 TIMES DAILY
Qty: 10 TABLET | Refills: 0 | Status: SHIPPED | OUTPATIENT
Start: 2025-02-06 | End: 2025-02-11

## 2025-02-06 RX ORDER — DICLOFENAC SODIUM 75 MG/1
75 TABLET, DELAYED RELEASE ORAL 2 TIMES DAILY PRN
COMMUNITY
Start: 2025-01-30

## 2025-02-06 RX ORDER — LOSARTAN POTASSIUM AND HYDROCHLOROTHIAZIDE 25; 100 MG/1; MG/1
1 TABLET ORAL DAILY
Qty: 90 TABLET | Refills: 0 | Status: SHIPPED | OUTPATIENT
Start: 2025-02-06

## 2025-02-06 SDOH — ECONOMIC STABILITY: FOOD INSECURITY: WITHIN THE PAST 12 MONTHS, THE FOOD YOU BOUGHT JUST DIDN'T LAST AND YOU DIDN'T HAVE MONEY TO GET MORE.: NEVER TRUE

## 2025-02-06 SDOH — ECONOMIC STABILITY: FOOD INSECURITY: WITHIN THE PAST 12 MONTHS, YOU WORRIED THAT YOUR FOOD WOULD RUN OUT BEFORE YOU GOT MONEY TO BUY MORE.: NEVER TRUE

## 2025-02-06 ASSESSMENT — ENCOUNTER SYMPTOMS
DIARRHEA: 0
BACK PAIN: 0
CONSTIPATION: 0
NAUSEA: 0
SHORTNESS OF BREATH: 0
CHEST TIGHTNESS: 0
COUGH: 0
ABDOMINAL PAIN: 0
VOMITING: 0
WHEEZING: 0

## 2025-02-06 ASSESSMENT — PATIENT HEALTH QUESTIONNAIRE - PHQ9
SUM OF ALL RESPONSES TO PHQ QUESTIONS 1-9: 0
1. LITTLE INTEREST OR PLEASURE IN DOING THINGS: NOT AT ALL
SUM OF ALL RESPONSES TO PHQ9 QUESTIONS 1 & 2: 0
SUM OF ALL RESPONSES TO PHQ QUESTIONS 1-9: 0
2. FEELING DOWN, DEPRESSED OR HOPELESS: NOT AT ALL
SUM OF ALL RESPONSES TO PHQ QUESTIONS 1-9: 0
SUM OF ALL RESPONSES TO PHQ QUESTIONS 1-9: 0

## 2025-02-06 NOTE — PROGRESS NOTES
\"Have you been to the ER, urgent care clinic since your last visit?  Hospitalized since your last visit?\"    NO    “Have you seen or consulted any other health care providers outside our system since your last visit?”    NO      “Have you had a colorectal cancer screening such as a colonoscopy/FIT/Cologuard?    NO    Date of last Colonoscopy: 8/28/2017  No cologuard on file  No FIT/FOBT on file   No flexible sigmoidoscopy on file     Chief Complaint   Patient presents with    Follow-up    Cholesterol Problem    Hypertension    Gastroesophageal Reflux     /88 (Site: Left Upper Arm, Position: Sitting, Cuff Size: Large Adult)   Pulse 83   Temp 97.8 °F (36.6 °C) (Skin)   Resp 16   Ht 1.702 m (5' 7\")   Wt 103.9 kg (229 lb)   SpO2 100%   BMI 35.87 kg/m²

## 2025-02-06 NOTE — PROGRESS NOTES
Anthony ROSANA Obinna  64 y.o. female  1960  [address] [facility] [address]  [id number]     [facility]: Progress Note       Encounter Date: 2/6/2025    Patient presents with the following chief complaint(s)    Chief Complaint   Patient presents with    Follow-up    Cholesterol Problem    Hypertension    Gastroesophageal Reflux        History provided by patient    Assessment and Plan:   1. Rheumatoid arthritis involving multiple sites with positive rheumatoid factor (HCC)  -     External Referral To Rheumatology  -     predniSONE (DELTASONE) 20 MG tablet; Take 1 tablet by mouth 2 times daily for 5 days, Disp-10 tablet, R-0Normal  2. Chronic pain of both shoulders  -     External Referral To Rheumatology  -     predniSONE (DELTASONE) 20 MG tablet; Take 1 tablet by mouth 2 times daily for 5 days, Disp-10 tablet, R-0Normal  3. Myositis of shoulder, unspecified laterality, unspecified myositis type (HCC)  -     External Referral To Rheumatology  -     predniSONE (DELTASONE) 20 MG tablet; Take 1 tablet by mouth 2 times daily for 5 days, Disp-10 tablet, R-0Normal  4. Encounter for screening for colorectal malignant neoplasm  -     FIT-DNA (Cologuard)  5. Fatigue, unspecified type  -     Vitamin D 25 Hydroxy; Future  -     Thyroid Cascade Profile; Future  -     Iron; Future  -     Vitamin B12; Future  6. Vitamin D deficiency  -     Vitamin D 25 Hydroxy; Future  7. Essential hypertension  Comments:  Continue Norvasc & Hyzaar. Pt to get labs done  Orders:  -     CBC; Future  -     Comprehensive Metabolic Panel; Future  -     amLODIPine (NORVASC) 10 MG tablet; Take 1 tablet by mouth daily, Disp-90 tablet, R-0Normal  -     losartan-hydroCHLOROthiazide (HYZAAR) 100-25 MG per tablet; Take 1 tablet by mouth daily, Disp-90 tablet, R-0Normal  8. Pure hypercholesterolemia  Comments:  Pt to get labs. Refilled medication today  Orders:  -     Lipid Panel; Future  -

## 2025-03-19 LAB
25(OH)D3+25(OH)D2 SERPL-MCNC: 10.7 NG/ML (ref 30–100)
ALBUMIN SERPL-MCNC: 4 G/DL (ref 3.9–4.9)
ALP SERPL-CCNC: 123 IU/L (ref 44–121)
ALT SERPL-CCNC: 12 IU/L (ref 0–32)
AST SERPL-CCNC: 15 IU/L (ref 0–40)
BILIRUB SERPL-MCNC: 0.4 MG/DL (ref 0–1.2)
BUN SERPL-MCNC: 12 MG/DL (ref 8–27)
BUN/CREAT SERPL: 14 (ref 12–28)
CALCIUM SERPL-MCNC: 9.3 MG/DL (ref 8.7–10.3)
CHLORIDE SERPL-SCNC: 106 MMOL/L (ref 96–106)
CHOLEST SERPL-MCNC: 187 MG/DL (ref 100–199)
CO2 SERPL-SCNC: 21 MMOL/L (ref 20–29)
CREAT SERPL-MCNC: 0.84 MG/DL (ref 0.57–1)
EGFRCR SERPLBLD CKD-EPI 2021: 79 ML/MIN/1.73
ERYTHROCYTE [DISTWIDTH] IN BLOOD BY AUTOMATED COUNT: 13 % (ref 11.7–15.4)
GLOBULIN SER CALC-MCNC: 3 G/DL (ref 1.5–4.5)
GLUCOSE SERPL-MCNC: 85 MG/DL (ref 70–99)
HBA1C MFR BLD: 5.9 % (ref 4.8–5.6)
HCT VFR BLD AUTO: 39 % (ref 34–46.6)
HDLC SERPL-MCNC: 42 MG/DL
HGB BLD-MCNC: 12.2 G/DL (ref 11.1–15.9)
IRON SERPL-MCNC: 65 UG/DL (ref 27–139)
LDLC SERPL CALC-MCNC: 129 MG/DL (ref 0–99)
MCH RBC QN AUTO: 25.8 PG (ref 26.6–33)
MCHC RBC AUTO-ENTMCNC: 31.3 G/DL (ref 31.5–35.7)
MCV RBC AUTO: 83 FL (ref 79–97)
PLATELET # BLD AUTO: 236 X10E3/UL (ref 150–450)
POTASSIUM SERPL-SCNC: 4.2 MMOL/L (ref 3.5–5.2)
PROT SERPL-MCNC: 7 G/DL (ref 6–8.5)
RBC # BLD AUTO: 4.73 X10E6/UL (ref 3.77–5.28)
SODIUM SERPL-SCNC: 143 MMOL/L (ref 134–144)
TRIGL SERPL-MCNC: 89 MG/DL (ref 0–149)
TSH SERPL DL<=0.005 MIU/L-ACNC: 2.15 UIU/ML (ref 0.45–4.5)
VLDLC SERPL CALC-MCNC: 16 MG/DL (ref 5–40)
WBC # BLD AUTO: 5.5 X10E3/UL (ref 3.4–10.8)

## 2025-03-20 ENCOUNTER — APPOINTMENT (OUTPATIENT)
Facility: HOSPITAL | Age: 65
End: 2025-03-20
Payer: COMMERCIAL

## 2025-03-20 ENCOUNTER — HOSPITAL ENCOUNTER (EMERGENCY)
Facility: HOSPITAL | Age: 65
Discharge: HOME | End: 2025-03-20
Attending: FAMILY MEDICINE
Payer: COMMERCIAL

## 2025-03-20 VITALS
SYSTOLIC BLOOD PRESSURE: 174 MMHG | OXYGEN SATURATION: 98 % | WEIGHT: 244 LBS | RESPIRATION RATE: 18 BRPM | DIASTOLIC BLOOD PRESSURE: 83 MMHG | BODY MASS INDEX: 38.3 KG/M2 | HEART RATE: 107 BPM | HEIGHT: 67 IN | TEMPERATURE: 98.1 F

## 2025-03-20 DIAGNOSIS — M25.572 ACUTE LEFT ANKLE PAIN: Primary | ICD-10-CM

## 2025-03-20 LAB — VIT B12 SERPL-MCNC: 400 PG/ML (ref 232–1245)

## 2025-03-20 PROCEDURE — 73610 X-RAY EXAM OF ANKLE: CPT

## 2025-03-20 PROCEDURE — 99283 EMERGENCY DEPT VISIT LOW MDM: CPT

## 2025-03-20 PROCEDURE — 6370000000 HC RX 637 (ALT 250 FOR IP): Performed by: FAMILY MEDICINE

## 2025-03-20 RX ORDER — LIDOCAINE 4 G/G
1 PATCH TOPICAL
Status: DISCONTINUED | OUTPATIENT
Start: 2025-03-20 | End: 2025-03-20 | Stop reason: HOSPADM

## 2025-03-20 RX ORDER — LIDOCAINE 4 G/G
PATCH TOPICAL
Qty: 5 EACH | Refills: 0 | Status: SHIPPED | OUTPATIENT
Start: 2025-03-20

## 2025-03-20 RX ORDER — NAPROXEN 500 MG/1
500 TABLET ORAL ONCE
Status: COMPLETED | OUTPATIENT
Start: 2025-03-20 | End: 2025-03-20

## 2025-03-20 RX ADMIN — NAPROXEN 500 MG: 500 TABLET ORAL at 10:21

## 2025-03-20 ASSESSMENT — PAIN SCALES - GENERAL: PAINLEVEL_OUTOF10: 9

## 2025-03-20 ASSESSMENT — PAIN - FUNCTIONAL ASSESSMENT: PAIN_FUNCTIONAL_ASSESSMENT: 0-10

## 2025-03-20 NOTE — ED PROVIDER NOTES
EMERGENCY DEPARTMENT HISTORY AND PHYSICAL EXAM      Date: 3/20/2025  Patient Name: Anthony Yeboah    History of Presenting Illness     Chief Complaint   Patient presents with    Ankle Pain       History Provided By:     HPI: Anthony Yeboah, is a very pleasant 64 y.o. female presenting to the ED with a chief complaint of ankle pain.  States she developed left ankle pain and swelling that started Tuesday.  No redness nor warmth.  Worse with walking, better with rest.  States she is on her feet for the majority of her workday.  No calf pain no swelling no tenderness.    Denies any other symptoms at this time.    PCP: Jennifer Gould PA-C    No current facility-administered medications on file prior to encounter.     Current Outpatient Medications on File Prior to Encounter   Medication Sig Dispense Refill    diclofenac (VOLTAREN) 75 MG EC tablet Take 1 tablet by mouth 2 times daily as needed      amLODIPine (NORVASC) 10 MG tablet Take 1 tablet by mouth daily 90 tablet 0    atorvastatin (LIPITOR) 40 MG tablet Take 1 tablet by mouth daily 90 tablet 0    cetirizine (ZYRTEC) 10 MG tablet Take 1 tablet by mouth nightly 90 tablet 0    losartan-hydroCHLOROthiazide (HYZAAR) 100-25 MG per tablet Take 1 tablet by mouth daily 90 tablet 0    omeprazole (PRILOSEC) 40 MG delayed release capsule Take 30 minutes before dinner once a day.as needed 90 capsule 0    albuterol sulfate HFA (VENTOLIN HFA) 108 (90 Base) MCG/ACT inhaler Inhale 2 puffs into the lungs 4 times daily as needed for Wheezing (Patient not taking: Reported on 2/6/2025) 18 g 2    ASPIRIN ADULT LOW DOSE PO Take 1 tablet by mouth daily      Blood Pressure Monitor KIT Use to check blood pressure daily.         Past History     Past Medical History:  Past Medical History:   Diagnosis Date    CAD (coronary artery disease)     GERD (gastroesophageal reflux disease)     Hypercholesterolemia     Hypertension        Past Surgical History:  Past Surgical History:

## 2025-03-20 NOTE — DISCHARGE INSTRUCTIONS
Thank you for choosing our Emergency Department for your care.  It is our privilege to care for you in your time of need.  In the next several days, you may receive a survey via email or mailed to your home about your experience with our team.  We would greatly appreciate you taking a few minutes to complete the survey, as we use this information to learn what we have done well and what we could be doing better. Thank you for trusting us with your care!    Below you will find a list of your tests from today's visit.   Labs and Radiology Studies  No results found for this or any previous visit (from the past 12 hours).  XR ANKLE LEFT (MIN 3 VIEWS)  Result Date: 3/20/2025  EXAM: XR ANKLE LEFT (MIN 3 VIEWS) INDICATION: ankle pain. COMPARISON: None. FINDINGS: Three views of the left ankle demonstrate no fracture or disruption of the ankle mortise.  There is no other acute osseous or articular abnormality. Tibiotalar joint effusion. Diffuse soft tissue swelling.     Tibiotalar joint effusion and diffuse soft tissue swelling, without acute fracture. Electronically signed by LUCIANA CHEW    ------------------------------------------------------------------------------------------------------------  The evaluation and treatment you received in the Emergency Department were for an urgent problem. It is important that you follow-up with a doctor, nurse practitioner, or physician assistant to:  (1) confirm your diagnosis,  (2) re-evaluation of changes in your illness and treatment, and (3) for ongoing care. Please take your discharge instructions with you when you go to your follow-up appointment.     If you have any problem arranging a follow-up appointment, contact us!  If your symptoms become worse or you do not improve as expected, please return to us. We are available 24 hours a day.     If a prescription has been provided, please fill it as soon as possible to prevent a delay in treatment. If you have any questions

## 2025-03-21 ENCOUNTER — RESULTS FOLLOW-UP (OUTPATIENT)
Facility: CLINIC | Age: 65
End: 2025-03-21

## 2025-03-21 DIAGNOSIS — E55.9 VITAMIN D DEFICIENCY: Primary | ICD-10-CM

## 2025-03-21 RX ORDER — ERGOCALCIFEROL 1.25 MG/1
50000 CAPSULE, LIQUID FILLED ORAL WEEKLY
Qty: 12 CAPSULE | Refills: 1 | Status: SHIPPED | OUTPATIENT
Start: 2025-03-21

## 2025-03-21 NOTE — RESULT ENCOUNTER NOTE
Please contact patient regarding labs results and recommendations. Thank you.     Dear Anthony Yeboah,    Thank you for completing your lab work. I have reviewed your lab results and interpretations are as follows:    -A1c has increased. Your A1c is elevated at 5.9%. Fasting sugars (glucose) between 100 and 125 or Hemoglobin A1C 5.6-6.4% are considered borderline or \"prediabetic\" implying an elevated risk for becoming diabetic in the near future. Weight loss with good healthy diet and routine, almost daily exercise may help delay or reverse this trend. Avoid sweets like candy, snacks, sodas etc., and limit starches such as rice, potatoes, bread, pasta, etc.    -Will Start vitamin D to take ONCE weekly- this can improve fatigue/ low energy    -Cholesterol is  improving. Diet recommendations for high cholesterol:  Avoid sugary drinks (i.e. sodas), eat only 1 starch per meal, portion should be not more than 1 cup, avoid bad fats that are in anything deep fried, fast foods, eat good fats that are in avocado, nuts, fish, seafood, olive oil, eat lean meats    Blood count stable, B12 normal, thyroid function and iron are also normal.

## 2025-05-08 ENCOUNTER — OFFICE VISIT (OUTPATIENT)
Facility: CLINIC | Age: 65
End: 2025-05-08
Payer: COMMERCIAL

## 2025-05-08 VITALS
SYSTOLIC BLOOD PRESSURE: 128 MMHG | HEART RATE: 96 BPM | DIASTOLIC BLOOD PRESSURE: 78 MMHG | RESPIRATION RATE: 16 BRPM | TEMPERATURE: 97.9 F | HEIGHT: 67 IN | WEIGHT: 222 LBS | OXYGEN SATURATION: 98 % | BODY MASS INDEX: 34.84 KG/M2

## 2025-05-08 DIAGNOSIS — F17.218 CIGARETTE NICOTINE DEPENDENCE WITH OTHER NICOTINE-INDUCED DISORDER: ICD-10-CM

## 2025-05-08 DIAGNOSIS — R06.2 WHEEZING: ICD-10-CM

## 2025-05-08 DIAGNOSIS — R53.83 FATIGUE, UNSPECIFIED TYPE: ICD-10-CM

## 2025-05-08 DIAGNOSIS — E78.00 PURE HYPERCHOLESTEROLEMIA: ICD-10-CM

## 2025-05-08 DIAGNOSIS — E55.9 VITAMIN D DEFICIENCY: ICD-10-CM

## 2025-05-08 DIAGNOSIS — K21.9 GASTROESOPHAGEAL REFLUX DISEASE, UNSPECIFIED WHETHER ESOPHAGITIS PRESENT: ICD-10-CM

## 2025-05-08 DIAGNOSIS — R73.03 PREDIABETES: ICD-10-CM

## 2025-05-08 DIAGNOSIS — Z91.09 ENVIRONMENTAL ALLERGIES: ICD-10-CM

## 2025-05-08 DIAGNOSIS — I10 ESSENTIAL HYPERTENSION: Primary | ICD-10-CM

## 2025-05-08 PROCEDURE — 3074F SYST BP LT 130 MM HG: CPT

## 2025-05-08 PROCEDURE — 3078F DIAST BP <80 MM HG: CPT

## 2025-05-08 PROCEDURE — 99214 OFFICE O/P EST MOD 30 MIN: CPT

## 2025-05-08 RX ORDER — ALBUTEROL SULFATE 90 UG/1
2 INHALANT RESPIRATORY (INHALATION) 4 TIMES DAILY PRN
Qty: 18 G | Refills: 2 | Status: SHIPPED | OUTPATIENT
Start: 2025-05-08

## 2025-05-08 RX ORDER — AMLODIPINE BESYLATE 10 MG/1
10 TABLET ORAL DAILY
Qty: 90 TABLET | Refills: 0 | Status: SHIPPED | OUTPATIENT
Start: 2025-05-08

## 2025-05-08 RX ORDER — CETIRIZINE HYDROCHLORIDE 10 MG/1
10 TABLET ORAL NIGHTLY
Qty: 90 TABLET | Refills: 0 | Status: SHIPPED | OUTPATIENT
Start: 2025-05-08

## 2025-05-08 RX ORDER — METHYLPREDNISOLONE 4 MG/1
TABLET ORAL
COMMUNITY
Start: 2025-03-24

## 2025-05-08 RX ORDER — LEFLUNOMIDE 20 MG/1
1 TABLET ORAL DAILY
COMMUNITY
Start: 2025-04-21

## 2025-05-08 RX ORDER — LOSARTAN POTASSIUM AND HYDROCHLOROTHIAZIDE 25; 100 MG/1; MG/1
1 TABLET ORAL DAILY
Qty: 90 TABLET | Refills: 0 | Status: SHIPPED | OUTPATIENT
Start: 2025-05-08

## 2025-05-08 RX ORDER — ROSUVASTATIN CALCIUM 40 MG/1
40 TABLET, COATED ORAL NIGHTLY
Qty: 90 TABLET | Refills: 0 | Status: SHIPPED | OUTPATIENT
Start: 2025-05-08

## 2025-05-08 RX ORDER — PANTOPRAZOLE SODIUM 40 MG/1
40 TABLET, DELAYED RELEASE ORAL
Qty: 90 TABLET | Refills: 0 | Status: SHIPPED | OUTPATIENT
Start: 2025-05-08

## 2025-05-08 RX ORDER — ERGOCALCIFEROL 1.25 MG/1
50000 CAPSULE, LIQUID FILLED ORAL
Qty: 24 CAPSULE | Refills: 0 | Status: SHIPPED | OUTPATIENT
Start: 2025-05-08

## 2025-05-08 ASSESSMENT — ENCOUNTER SYMPTOMS
DIARRHEA: 0
BACK PAIN: 0
WHEEZING: 0
CONSTIPATION: 0
CHEST TIGHTNESS: 0
NAUSEA: 0
ABDOMINAL PAIN: 0
COUGH: 0
VOMITING: 0
SHORTNESS OF BREATH: 0

## 2025-05-08 NOTE — PROGRESS NOTES
Have you been to the ER, urgent care clinic since your last visit?  Hospitalized since your last visit?   YES - When: approximately 1 month ago.  Where and Why: [facility] for ankle pain.    Have you seen or consulted any other health care providers outside our system since your last visit?   NO      “Have you had a colorectal cancer screening such as a colonoscopy/FIT/Cologuard?    NO    Date of last Colonoscopy: 8/28/2017  No cologuard on file  No FIT/FOBT on file   No flexible sigmoidoscopy on file     Chief Complaint   Patient presents with    Follow-up    Hypertension    Cholesterol Problem    Gastroesophageal Reflux     /78 (BP Site: Left Upper Arm, Patient Position: Sitting, BP Cuff Size: Large Adult)   Pulse 96   Temp 97.9 °F (36.6 °C) (Skin)   Resp 16   Ht 1.702 m (5' 7\")   Wt 100.7 kg (222 lb)   SpO2 98%   BMI 34.77 kg/m²        
Allergies    Disposition   No follow-up provider specified.  Future Appointments   Date Time Provider Department Center   8/14/2025  1:30 PM Jennifer Gould PA-C [facility]

## 2025-06-12 ENCOUNTER — HOSPITAL ENCOUNTER (EMERGENCY)
Facility: HOSPITAL | Age: 65
Discharge: HOME | End: 2025-06-12
Attending: EMERGENCY MEDICINE
Payer: COMMERCIAL

## 2025-06-12 VITALS
HEART RATE: 95 BPM | DIASTOLIC BLOOD PRESSURE: 91 MMHG | OXYGEN SATURATION: 96 % | TEMPERATURE: 98.2 F | RESPIRATION RATE: 18 BRPM | SYSTOLIC BLOOD PRESSURE: 178 MMHG

## 2025-06-12 DIAGNOSIS — I10 UNCONTROLLED HYPERTENSION: ICD-10-CM

## 2025-06-12 DIAGNOSIS — J20.9 ACUTE BRONCHITIS, UNSPECIFIED ORGANISM: Primary | ICD-10-CM

## 2025-06-12 PROCEDURE — 6370000000 HC RX 637 (ALT 250 FOR IP): Performed by: EMERGENCY MEDICINE

## 2025-06-12 PROCEDURE — 99283 EMERGENCY DEPT VISIT LOW MDM: CPT

## 2025-06-12 RX ORDER — PREDNISONE 20 MG/1
60 TABLET ORAL
Status: COMPLETED | OUTPATIENT
Start: 2025-06-12 | End: 2025-06-12

## 2025-06-12 RX ORDER — PREDNISONE 20 MG/1
TABLET ORAL
Qty: 12 TABLET | Refills: 1 | Status: SHIPPED | OUTPATIENT
Start: 2025-06-12

## 2025-06-12 RX ORDER — ALBUTEROL SULFATE 90 UG/1
2 INHALANT RESPIRATORY (INHALATION) 4 TIMES DAILY PRN
Qty: 54 G | Refills: 1 | Status: SHIPPED | OUTPATIENT
Start: 2025-06-12

## 2025-06-12 RX ORDER — GUAIFENESIN 600 MG/1
1200 TABLET, EXTENDED RELEASE ORAL 2 TIMES DAILY
Qty: 30 TABLET | Refills: 0 | Status: SHIPPED | OUTPATIENT
Start: 2025-06-12 | End: 2025-06-27

## 2025-06-12 RX ORDER — AZITHROMYCIN 250 MG/1
TABLET, FILM COATED ORAL
Qty: 1 PACKET | Refills: 0 | Status: SHIPPED | OUTPATIENT
Start: 2025-06-12

## 2025-06-12 RX ADMIN — PREDNISONE 60 MG: 20 TABLET ORAL at 19:24

## 2025-06-13 NOTE — ED PROVIDER NOTES
use.  O2 saturation 96% on room air.  Nasal swab for COVID and influenza are considered however not ordered due to low clinical suspicions.  Chest x-ray was considered for possible pneumonia however not ordered due to low clinical suspicion.    Blood pressure is elevated.  Discussed with patient regarding follow-up with the primary to maximize her blood pressure control.  No headache, chest pain, or shortness of breath.  No clinical indication for emergent pharmacological intervention at this time    Patient was discharged in stable condition with a course of Zithromax/prednisone/albuterol inhaler./Mucinex for the treatment of bronchitis.  Follow-up with the primary recommended.  Return cautions given.  Patient is comfortable with the care plan.        Records Reviewed (source and summary of external notes): Prior medical records and Nursing notes.    Vitals:    Vitals:    06/12/25 1851 06/12/25 1915   BP: (!) 160/96 (!) 178/91   Pulse: (!) 106 95   Resp: 18 18   Temp: 98.2 °F (36.8 °C)    SpO2: 95% 96%        ED COURSE     Tolerated p.o. without vomiting.  No respiratory distress      Smoking Cessation: Smoking Cessation Counseling  Patient has been smoking for 40 years.  Discussed the risks of smoking tobacco products and the long term sequelae of tobacco use with the patient such as increased risk of heart attack, stroke, peripheral artery disease and cancer. The patient verbalized understanding and were provided resources if asked. Patient is  considering quitting.  Counseled patient for more than 3 minutes.     SEPSIS REASSESSMENT: Patient does NOT meet Sepsis criteria after ED workup    Patient was given the following medications:  Medications   predniSONE (DELTASONE) tablet 60 mg (60 mg Oral Given 6/12/25 1924)       CONSULTS: See ED Course/MDM for further details.            PROCEDURES   Unless otherwise noted above, none  Procedures      CRITICAL CARE TIME       ED IMPRESSION     1. Acute bronchitis,  DOSEPACK     pantoprazole 40 MG tablet  Commonly known as: PROTONIX  Take 1 tablet by mouth every morning (before breakfast)     rosuvastatin 40 MG tablet  Commonly known as: CRESTOR  Take 1 tablet by mouth at bedtime     vitamin D 1.25 MG (72182 UT) Caps capsule  Commonly known as: ERGOCALCIFEROL  Take 1 capsule by mouth Twice a Week               Where to Get Your Medications        These medications were sent to [facility] [address] [place] [address] - P [phone] - F [phone]  [address] [place] [id number]      Phone: [phone]   albuterol sulfate  (90 Base) MCG/ACT inhaler  azithromycin 250 MG tablet  guaiFENesin 600 MG extended release tablet  predniSONE 20 MG tablet           DISCONTINUED MEDICATIONS:  Discharge Medication List as of 6/12/2025  7:27 PM          I am the Primary Clinician of Record. Genet gill MD (electronically signed)    (Please note that parts of this dictation were completed with voice recognition software. Quite often unanticipated grammatical, syntax, homophones, and other interpretive errors are inadvertently transcribed by the computer software. Please disregards these errors. Please excuse any errors that have escaped final proofreading.)     Genet Gill MD  06/12/25 2033       Genet Gill MD  06/12/25 2035

## 2025-08-14 ENCOUNTER — OFFICE VISIT (OUTPATIENT)
Facility: CLINIC | Age: 65
End: 2025-08-14
Payer: COMMERCIAL

## 2025-08-14 VITALS
HEIGHT: 67 IN | DIASTOLIC BLOOD PRESSURE: 72 MMHG | TEMPERATURE: 97.9 F | HEART RATE: 106 BPM | SYSTOLIC BLOOD PRESSURE: 130 MMHG | RESPIRATION RATE: 16 BRPM | OXYGEN SATURATION: 96 % | WEIGHT: 224 LBS | BODY MASS INDEX: 35.16 KG/M2

## 2025-08-14 DIAGNOSIS — E78.00 PURE HYPERCHOLESTEROLEMIA: ICD-10-CM

## 2025-08-14 DIAGNOSIS — I10 ESSENTIAL HYPERTENSION: Primary | ICD-10-CM

## 2025-08-14 DIAGNOSIS — R53.83 FATIGUE, UNSPECIFIED TYPE: ICD-10-CM

## 2025-08-14 DIAGNOSIS — M05.79 RHEUMATOID ARTHRITIS INVOLVING MULTIPLE SITES WITH POSITIVE RHEUMATOID FACTOR (HCC): ICD-10-CM

## 2025-08-14 DIAGNOSIS — R73.03 PREDIABETES: ICD-10-CM

## 2025-08-14 DIAGNOSIS — Z91.09 ENVIRONMENTAL ALLERGIES: ICD-10-CM

## 2025-08-14 DIAGNOSIS — I10 ESSENTIAL HYPERTENSION: ICD-10-CM

## 2025-08-14 DIAGNOSIS — K21.9 GASTROESOPHAGEAL REFLUX DISEASE, UNSPECIFIED WHETHER ESOPHAGITIS PRESENT: ICD-10-CM

## 2025-08-14 PROCEDURE — 3075F SYST BP GE 130 - 139MM HG: CPT

## 2025-08-14 PROCEDURE — 99214 OFFICE O/P EST MOD 30 MIN: CPT

## 2025-08-14 PROCEDURE — 3078F DIAST BP <80 MM HG: CPT

## 2025-08-14 RX ORDER — CYANOCOBALAMIN 1000 UG/ML
1000 INJECTION, SOLUTION INTRAMUSCULAR; SUBCUTANEOUS ONCE
Qty: 1 ML | Refills: 5 | Status: SHIPPED | OUTPATIENT
Start: 2025-08-14 | End: 2025-08-14

## 2025-08-14 RX ORDER — PANTOPRAZOLE SODIUM 40 MG/1
40 TABLET, DELAYED RELEASE ORAL
Qty: 90 TABLET | Refills: 0 | Status: SHIPPED | OUTPATIENT
Start: 2025-08-14

## 2025-08-14 RX ORDER — CETIRIZINE HYDROCHLORIDE 10 MG/1
10 TABLET ORAL NIGHTLY
Qty: 90 TABLET | Refills: 0 | Status: SHIPPED | OUTPATIENT
Start: 2025-08-14

## 2025-08-14 RX ORDER — AMLODIPINE BESYLATE 10 MG/1
10 TABLET ORAL DAILY
Qty: 90 TABLET | Refills: 0 | Status: SHIPPED | OUTPATIENT
Start: 2025-08-14

## 2025-08-14 RX ORDER — LOSARTAN POTASSIUM AND HYDROCHLOROTHIAZIDE 25; 100 MG/1; MG/1
1 TABLET ORAL DAILY
Qty: 90 TABLET | Refills: 0 | Status: SHIPPED | OUTPATIENT
Start: 2025-08-14

## 2025-08-14 RX ORDER — ROSUVASTATIN CALCIUM 40 MG/1
40 TABLET, COATED ORAL NIGHTLY
Qty: 90 TABLET | Refills: 0 | Status: SHIPPED | OUTPATIENT
Start: 2025-08-14

## 2025-08-14 ASSESSMENT — ENCOUNTER SYMPTOMS
BACK PAIN: 0
DIARRHEA: 0
NAUSEA: 0
WHEEZING: 0
COUGH: 0
CHEST TIGHTNESS: 0
VOMITING: 0
SHORTNESS OF BREATH: 0
ABDOMINAL PAIN: 0
CONSTIPATION: 0